# Patient Record
Sex: MALE | Race: OTHER | Employment: OTHER | URBAN - METROPOLITAN AREA
[De-identification: names, ages, dates, MRNs, and addresses within clinical notes are randomized per-mention and may not be internally consistent; named-entity substitution may affect disease eponyms.]

---

## 2017-01-23 DIAGNOSIS — I65.21 CAROTID ARTERY CALCIFICATION, RIGHT: Primary | ICD-10-CM

## 2017-01-23 NOTE — PROGRESS NOTES
The patient had a scan of the head and neck. Calcifications were found on the right carotid area.  I spoke to him and he agreed to have carotid dopplers done

## 2017-01-31 ENCOUNTER — HOSPITAL ENCOUNTER (OUTPATIENT)
Dept: VASCULAR SURGERY | Age: 77
Discharge: HOME | End: 2017-01-31
Attending: FAMILY MEDICINE
Payer: MEDICARE

## 2017-01-31 DIAGNOSIS — I65.21 CAROTID ARTERY CALCIFICATION, RIGHT: ICD-10-CM

## 2017-01-31 PROCEDURE — 93880 EXTRACRANIAL BILAT STUDY: CPT

## 2017-01-31 NOTE — PROCEDURES
[address]  *** FINAL REPORT ***    Name: Amira Maxwell  MRN: [id number]    Outpatient  : 1940  HIS Order #: [id number]  [id number] [facility] Visit #: [id number]  Date: 2017    TYPE OF TEST: Cerebrovascular Duplex    REASON FOR TEST  Carotid bruit    Right Carotid:-             Proximal               Mid                 Distal  cm/s  Systolic  Diastolic  Systolic  Diastolic  Systolic  Diastolic  CCA:    377.6      24.6                            88.8      18.9  Bulb:  ECA:    128.6      12.3  ICA:     76.5      22.6       96.5      29.2      117.3      31.7  ICA/CCA:  0.9       1.2    ICA Stenosis: <50%    Right Vertebral:-  Finding: Antegrade  Sys:       66.6  Wendy:       18.2    Right Subclavian:    Left Carotid:-            Proximal                Mid                 Distal  cm/s  Systolic  Diastolic  Systolic  Diastolic  Systolic  Diastolic  CCA:    111.7      31.0                           109.6      20.9  Bulb:  ECA:    137.1      11.1  ICA:    109.4      15.0       83.6      28.0       87.5      30.5  ICA/CCA:  1.0       0.7    ICA Stenosis: <50%    Left Vertebral:-  Finding: Antegrade  Sys:       52.9  Wendy:       13.6    Left Subclavian:    INTERPRETATION/FINDINGS  PROCEDURE:  Evaluation of the extracranial cerebrovascular arteries  with ultrasound (B-mode imaging, pulsed Doppler, color Doppler). Includes the common carotid, internal carotid, external carotid, and  vertebral arteries. FINDINGS: Intimal thickening observed in the CCA bilaterally. Mild-moderate hypoechoic plaque noted in the bulb and ICA. IMPRESSION: Findings are consistent with 0-49% stenosis of the right  internal carotid and 0-49% stenosis of the left internal carotid. Vertebrals are patent with antegrade flow. ADDITIONAL COMMENTS    I have personally reviewed the data relevant to the interpretation of  this  study.     TECHNOLOGIST: Judit Claire RDCS  Signed: 2017 04:25 PM    PHYSICIAN: Esme Hough. Rita Welch MD  Signed: 02/01/2017 08:31 AM

## 2017-02-02 NOTE — PROGRESS NOTES
Both of the carotid arteries have a mild-moderate amount of cholesterol plaque. It is less than 50 % so there is no need for surgery. An aspirin a day would be helpful to avoid more blockage. Also avoid fried food and keep the cholesterol level down. I will recheck your cholesterol in march-April.

## 2017-07-07 ENCOUNTER — OFFICE VISIT (OUTPATIENT)
Dept: FAMILY MEDICINE CLINIC | Age: 77
End: 2017-07-07

## 2017-07-07 VITALS
OXYGEN SATURATION: 96 % | WEIGHT: 187.2 LBS | SYSTOLIC BLOOD PRESSURE: 130 MMHG | BODY MASS INDEX: 28.37 KG/M2 | DIASTOLIC BLOOD PRESSURE: 78 MMHG | TEMPERATURE: 97.9 F | RESPIRATION RATE: 17 BRPM | HEART RATE: 84 BPM | HEIGHT: 68 IN

## 2017-07-07 DIAGNOSIS — C61 PROSTATE CANCER (HCC): Primary | ICD-10-CM

## 2017-07-07 DIAGNOSIS — E78.5 HYPERLIPIDEMIA, UNSPECIFIED HYPERLIPIDEMIA TYPE: ICD-10-CM

## 2017-07-07 DIAGNOSIS — I10 ESSENTIAL HYPERTENSION: ICD-10-CM

## 2017-07-07 DIAGNOSIS — E87.0 HYPERNATREMIA: ICD-10-CM

## 2017-07-07 DIAGNOSIS — R09.89 BRUIT OF RIGHT CAROTID ARTERY: ICD-10-CM

## 2017-07-07 RX ORDER — DOXAZOSIN 8 MG/1
8 TABLET ORAL DAILY
Qty: 30 TAB | Refills: 6 | Status: SHIPPED | OUTPATIENT
Start: 2017-07-07 | End: 2017-12-14 | Stop reason: SDUPTHER

## 2017-07-07 RX ORDER — TIMOLOL MALEATE 5 MG/ML
SOLUTION/ DROPS OPHTHALMIC
Refills: 5 | COMMUNITY
Start: 2017-03-30 | End: 2018-04-24

## 2017-07-07 RX ORDER — ENALAPRIL MALEATE 20 MG/1
20 TABLET ORAL DAILY
Qty: 30 TAB | Refills: 6 | Status: SHIPPED | OUTPATIENT
Start: 2017-07-07 | End: 2017-12-14 | Stop reason: SDUPTHER

## 2017-07-07 RX ORDER — BIMATOPROST 0.1 MG/ML
SOLUTION/ DROPS OPHTHALMIC
Refills: 3 | COMMUNITY
Start: 2017-03-30

## 2017-07-07 RX ORDER — FELODIPINE 10 MG/1
10 TABLET, EXTENDED RELEASE ORAL DAILY
Qty: 30 TAB | Refills: 6 | Status: SHIPPED | OUTPATIENT
Start: 2017-07-07 | End: 2017-12-14 | Stop reason: SDUPTHER

## 2017-07-07 NOTE — MR AVS SNAPSHOT
Visit Information Date & Time Provider Department Dept. Phone Encounter #  
 7/7/2017 10:30 AM Cherelle Haq MD [address] [place] [address] [id number] Upcoming Health Maintenance Date Due DTaP/Tdap/Td series (1 - Tdap) 6/29/1959 ZOSTER VACCINE AGE 60> 6/29/1998 GLAUCOMA SCREENING Q2Y 6/29/2003 Pneumococcal 65+ High/Highest Risk (1 of 2 - PCV13) 6/29/2003 INFLUENZA AGE 9 TO ADULT 8/1/2017 MEDICARE YEARLY EXAM 12/23/2017 Allergies as of 7/7/2017  Review Complete On: 7/7/2017 By: Cherelle Haq MD  
 No Known Allergies Current Immunizations  Never Reviewed No immunizations on file. Not reviewed this visit You Were Diagnosed With   
  
 Codes Comments Prostate cancer [facility])    -  Primary ICD-10-CM: Y38 ICD-9-CM: 280 Essential hypertension     ICD-10-CM: I10 
ICD-9-CM: 401.9 Bruit of right carotid artery     ICD-10-CM: R09.89 ICD-9-CM: 143. 9 Hyperlipidemia, unspecified hyperlipidemia type     ICD-10-CM: E78.5 ICD-9-CM: 272.4 Vitals BP Pulse Temp Resp Height(growth percentile) Weight(growth percentile) 130/78 84 97.9 °F (36.6 °C) (Oral) 17 5' 8\" (1.727 m) 187 lb 3.2 oz (84.9 kg) SpO2 BMI Smoking Status 96% 28.46 kg/m2 Passive Smoke Exposure - Never Smoker Vitals History BMI and BSA Data Body Mass Index Body Surface Area  
 28.46 kg/m 2 2.02 m 2 Preferred Pharmacy Pharmacy Name Phone [facility] [place], [address] [place] [facility] [phone] Your Updated Medication List  
  
   
This list is accurate as of: 7/7/17 11:23 AM.  Always use your most recent med list.  
  
  
  
  
 doxazosin 8 mg tablet Commonly known as:  CARDURA Take 1 Tab by mouth daily. enalapril 20 mg tablet Commonly known as:  Era Saupe Take 1 Tab by mouth daily. felodipine 10 mg 24 hr tablet Commonly known as:  PLENDIL SR Take 1 Tab by mouth daily. FLONASE 50 mcg/actuation nasal spray Generic drug:  fluticasone  
nightly. LUMIGAN 0.01 % ophthalmic drops Generic drug:  bimatoprost  
INT 1 GTT IN OU QD HS  
  
 propranolol 20 mg tablet Commonly known as:  INDERAL Take 1 Tab by mouth daily. timolol 0.5 % ophthalmic solution Commonly known as:  TIMOPTIC INT 1 GTT IN OU QAM  
  
  
  
  
Prescriptions Sent to Pharmacy Refills  
 doxazosin (CARDURA) 8 mg tablet 6 Sig: Take 1 Tab by mouth daily. Class: Normal  
 Pharmacy: [address] [facility] Ph #: [phone] Route: Oral  
 enalapril (VASOTEC) 20 mg tablet 6 Sig: Take 1 Tab by mouth daily. Class: Normal  
 Pharmacy: [address] [facility] Ph #: [phone] Route: Oral  
 felodipine (PLENDIL SR) 10 mg 24 hr tablet 6 Sig: Take 1 Tab by mouth daily. Class: Normal  
 Pharmacy: [address] [place] [address] Ph #: [phone] Route: Oral  
  
We Performed the Following LIPID PANEL [61440 CPT(R)] METABOLIC PANEL, COMPREHENSIVE [25173 CPT(R)] PSA W/ REFLX FREE PSA [17560 CPT(R)] REFERRAL TO UROLOGY [CUG152 Custom] Comments: H/o prostate CA, needs to continue care and surveilance. eval and treat Referral Information Referral ID Referred By Referred To  
  
 [id number] [facility] Erwin [address] [phone] [id number] [facility], [address] Visits Status Start Date End Date 1 New Request 7/7/17 7/7/18 If your referral has a status of pending review or denied, additional information will be sent to support the outcome of this decision. Patient Instructions DASH Diet: Care Instructions Your Care Instructions The DASH diet is an eating plan that can help lower your blood pressure. DASH stands for Dietary Approaches to Stop Hypertension. Hypertension is high blood pressure. The DASH diet focuses on eating foods that are high in calcium, potassium, and magnesium. These nutrients can lower blood pressure. The foods that are highest in these nutrients are fruits, vegetables, low-fat dairy products, nuts, seeds, and legumes. But taking calcium, potassium, and magnesium supplements instead of eating foods that are high in those nutrients does not have the same effect. The DASH diet also includes whole grains, fish, and poultry. The DASH diet is one of several lifestyle changes your doctor may recommend to lower your high blood pressure. Your doctor may also want you to decrease the amount of sodium in your diet. Lowering sodium while following the DASH diet can lower blood pressure even further than just the DASH diet alone. Follow-up care is a key part of your treatment and safety. Be sure to make and go to all appointments, and call your doctor if you are having problems. It's also a good idea to know your test results and keep a list of the medicines you take. How can you care for yourself at home? Following the DASH diet · Eat 4 to 5 servings of fruit each day. A serving is 1 medium-sized piece of fruit, ½ cup chopped or canned fruit, 1/4 cup dried fruit, or 4 ounces (½ cup) of fruit juice. Choose fruit more often than fruit juice. · Eat 4 to 5 servings of vegetables each day. A serving is 1 cup of lettuce or raw leafy vegetables, ½ cup of chopped or cooked vegetables, or 4 ounces (½ cup) of vegetable juice. Choose vegetables more often than vegetable juice. · Get 2 to 3 servings of low-fat and fat-free dairy each day. A serving is 8 ounces of milk, 1 cup of yogurt, or 1 ½ ounces of cheese. · Eat 6 to 8 servings of grains each day.  A serving is 1 slice of bread, 1 ounce of dry cereal, or ½ cup of cooked rice, pasta, or cooked cereal. Try to choose whole-grain products as much as possible. · Limit lean meat, poultry, and fish to 2 servings each day. A serving is 3 ounces, about the size of a deck of cards. · Eat 4 to 5 servings of nuts, seeds, and legumes (cooked dried beans, lentils, and split peas) each week. A serving is 1/3 cup of nuts, 2 tablespoons of seeds, or ½ cup of cooked beans or peas. · Limit fats and oils to 2 to 3 servings each day. A serving is 1 teaspoon of vegetable oil or 2 tablespoons of salad dressing. · Limit sweets and added sugars to 5 servings or less a week. A serving is 1 tablespoon jelly or jam, ½ cup sorbet, or 1 cup of lemonade. · Eat less than 2,300 milligrams (mg) of sodium a day. If you limit your sodium to 1,500 mg a day, you can lower your blood pressure even more. Tips for success · Start small. Do not try to make dramatic changes to your diet all at once. You might feel that you are missing out on your favorite foods and then be more likely to not follow the plan. Make small changes, and stick with them. Once those changes become habit, add a few more changes. · Try some of the following: ¨ Make it a goal to eat a fruit or vegetable at every meal and at snacks. This will make it easy to get the recommended amount of fruits and vegetables each day. ¨ Try yogurt topped with fruit and nuts for a snack or healthy dessert. ¨ Add lettuce, tomato, cucumber, and onion to sandwiches. ¨ Combine a ready-made pizza crust with low-fat mozzarella cheese and lots of vegetable toppings. Try using tomatoes, squash, spinach, broccoli, carrots, cauliflower, and onions. ¨ Have a variety of cut-up vegetables with a low-fat dip as an appetizer instead of chips and dip. ¨ Sprinkle sunflower seeds or chopped almonds over salads. Or try adding chopped walnuts or almonds to cooked vegetables. ¨ Try some vegetarian meals using beans and peas. Add garbanzo or kidney beans to salads. Make burritos and tacos with mashed perez beans or black beans. Where can you learn more? Go to http://yehuda-goldie.info/. Enter H038 in the search box to learn more about \"DASH Diet: Care Instructions. \" Current as of: April 3, 2017 Content Version: 11.3 © 8635-7152 vip.[employer]. Care instructions adapted under license by [employer] (which disclaims liability or warranty for this information). If you have questions about a medical condition or this instruction, always ask your healthcare professional. [address] any warranty or liability for your use of this information. Learning About High Blood Pressure What is high blood pressure? Blood pressure is a measure of how hard the blood pushes against the walls of your arteries. It's normal for blood pressure to go up and down throughout the day, but if it stays up, you have high blood pressure. Another name for high blood pressure is hypertension. Two numbers tell you your blood pressure. The first number is the systolic pressure. It shows how hard the blood pushes when your heart is pumping. The second number is the diastolic pressure. It shows how hard the blood pushes between heartbeats, when your heart is relaxed and filling with blood. A blood pressure of less than 120/80 (say \"120 over 80\") is ideal for an adult. High blood pressure is 140/90 or higher. You have high blood pressure if your top number is 140 or higher or your bottom number is 90 or higher, or both. Many people fall into the category in between, called prehypertension. People with prehypertension need to make lifestyle changes to bring their blood pressure down and help prevent or delay high blood pressure. What happens when you have high blood pressure? · Blood flows through your arteries with too much force. Over time, this damages the walls of your arteries. But you can't feel it. High blood pressure usually doesn't cause symptoms. · Fat and calcium start to build up in your arteries. This buildup is called plaque. Plaque makes your arteries narrower and stiffer. Blood can't flow through them as easily. · This lack of good blood flow starts to damage some of the organs in your body. This can lead to problems such as coronary artery disease and heart attack, heart failure, stroke, kidney failure, and eye damage. How can you prevent high blood pressure? · Stay at a healthy weight. · Try to limit how much sodium you eat to less than 2,300 milligrams (mg) a day. If you limit your sodium to 1,500 mg a day, you can lower your blood pressure even more. ¨ Buy foods that are labeled \"unsalted,\" \"sodium-free,\" or \"low-sodium. \" Foods labeled \"reduced-sodium\" and \"light sodium\" may still have too much sodium. ¨ Flavor your food with garlic, lemon juice, onion, vinegar, herbs, and spices instead of salt. Do not use soy sauce, steak sauce, onion salt, garlic salt, mustard, or ketchup on your food. ¨ Use less salt (or none) when recipes call for it. You can often use half the salt a recipe calls for without losing flavor. · Be physically active. Get at least 30 minutes of exercise on most days of the week. Walking is a good choice. You also may want to do other activities, such as running, swimming, cycling, or playing tennis or team sports. · Limit alcohol to 2 drinks a day for men and 1 drink a day for women. · Eat plenty of fruits, vegetables, and low-fat dairy products. Eat less saturated and total fats. How is high blood pressure treated? · Your doctor will suggest making lifestyle changes. For example, your doctor may ask you to eat healthy foods, quit smoking, lose extra weight, and be more active. · If lifestyle changes don't help enough or your blood pressure is very high, you will have to take medicine every day. Follow-up care is a key part of your treatment and safety. Be sure to make and go to all appointments, and call your doctor if you are having problems. It's also a good idea to know your test results and keep a list of the medicines you take. Where can you learn more? Go to http://yehuda-goldie.info/. Enter P501 in the search box to learn more about \"Learning About High Blood Pressure. \" Current as of: March 23, 2016 Content Version: 11.3 © 7568-6254 [employer]. Care instructions adapted under license by [employer] (which disclaims liability or warranty for this information). If you have questions about a medical condition or this instruction, always ask your healthcare professional. [address] any warranty or liability for your use of this information. Introducing [facility]! Dear Juanis Delatorre: Thank you for requesting a Pathable account. Our records indicate that you already have an active Pathable account. You can access your account anytime at [email or web address] Did you know that you can access your hospital and ER discharge instructions at any time in Pathable? You can also review all of your test results from your hospital stay or ER visit. Additional Information If you have questions, please visit the Frequently Asked Questions section of the Pathable website at [email or web address] Remember, Pathable is NOT to be used for urgent needs. For medical emergencies, dial 911. Now available from your iPhone and Android! Please provide this summary of care documentation to your next provider. Your primary care clinician is listed as Vera Pearson. If you have any questions after today's visit, please call [phone].

## 2017-07-07 NOTE — PROGRESS NOTES
Chief Complaint   Patient presents with    Hypertension     he is a 76y.o. year old male who presents for evalution. No chest pain or sob , doing well  He has h/o prostate cancer. His last psa in Dec was 8.8 ut he did not see the urology clinic yet      Reviewed PmHx, RxHx, FmHx, SocHx, AllgHx and updated and dated in the chart. Patient Active Problem List    Diagnosis    Prostate cancer [facility])    Essential hypertension       Nurse notes were reviewed and copied and are correct  Review of Systems - negative except as listed above in the HPI    Objective:     Vitals:    07/07/17 1043   BP: 130/78   Pulse: 84   Resp: 17   Temp: 97.9 °F (36.6 °C)   TempSrc: Oral   SpO2: 96%   Weight: 187 lb 3.2 oz (84.9 kg)   Height: 5' 8\" (1.727 m)     Physical Examination: General appearance - alert, well appearing, and in no distress and oriented to person, place, and time  Mental status - alert, oriented to person, place, and time  Neck - supple, no significant adenopathy, carotid bruit noted on right  Lymphatics - no hepatosplenomegaly  Chest - clear to auscultation, no wheezes, rales or rhonchi, symmetric air entry  Heart - normal rate, regular rhythm, normal S1, S2, no murmurs, rubs, clicks or gallops  Neurological - alert, oriented, normal speech, no focal findings or movement disorder noted  Musculoskeletal - no joint tenderness, deformity or swelling  Extremities - peripheral pulses normal, no pedal edema, no clubbing or cyanosis  Skin - normal coloration and turgor, no rashes, no suspicious skin lesions noted         Assessment/ Plan:   Croina Sun was seen today for hypertension. Diagnoses and all orders for this visit:    Prostate cancer ([facility] [address]  -     REFERRAL TO UROLOGY  -     PSA W/ REFLX FREE PSA    Essential hypertension  -     doxazosin (CARDURA) 8 mg tablet; Take 1 Tab by mouth daily. -     enalapril (VASOTEC) 20 mg tablet; Take 1 Tab by mouth daily. -     felodipine (PLENDIL SR) 10 mg 24 hr tablet;  Take 1 Tab by mouth daily.  -     METABOLIC PANEL, COMPREHENSIVE    Bruit of right carotid artery  -     LIPID PANEL    Hyperlipidemia, unspecified hyperlipidemia type  -     LIPID PANEL      If the ldl has not decreased I will start lipitor. I discussed this plan with Mr Prabhjot Kan and he agrees  Follow-up Disposition: Not on File    ICD-10-CM ICD-9-CM    1. Prostate cancer ([facility] [address] C61 185 REFERRAL TO UROLOGY      PSA W/ REFLX FREE PSA   2. Essential hypertension I10 401.9 doxazosin (CARDURA) 8 mg tablet      enalapril (VASOTEC) 20 mg tablet      felodipine (PLENDIL SR) 10 mg 24 hr tablet      METABOLIC PANEL, COMPREHENSIVE   3. Bruit of right carotid artery R09.89 785.9 LIPID PANEL   4. Hyperlipidemia, unspecified hyperlipidemia type E78.5 272.4 LIPID PANEL       I have discussed the diagnosis with the patient and the intended plan as seen in the above orders. The patient has received an after-visit summary and questions were answered concerning future plans. Medication Side Effects and Warnings were discussed with patient: yes  Patient Labs were reviewed and or requested: yes  Patient Past Records were reviewed and or requested: yes        There are no Patient Instructions on file for this visit.     The patient verbalizes understanding and agrees with the plan of care        Patient has the advanced directives booklet to review

## 2017-07-07 NOTE — PROGRESS NOTES
1. Have you been to the ER, urgent care clinic since your last visit? Hospitalized since your last visit? No     2. Have you seen or consulted any other health care providers outside of the [address] [place] [address] since your last visit? Include any pap smears or colon screening.  No    Chief Complaint   Patient presents with    Hypertension

## 2017-07-07 NOTE — PATIENT INSTRUCTIONS
DASH Diet: Care Instructions  Your Care Instructions  The DASH diet is an eating plan that can help lower your blood pressure. DASH stands for Dietary Approaches to Stop Hypertension. Hypertension is high blood pressure. The DASH diet focuses on eating foods that are high in calcium, potassium, and magnesium. These nutrients can lower blood pressure. The foods that are highest in these nutrients are fruits, vegetables, low-fat dairy products, nuts, seeds, and legumes. But taking calcium, potassium, and magnesium supplements instead of eating foods that are high in those nutrients does not have the same effect. The DASH diet also includes whole grains, fish, and poultry. The DASH diet is one of several lifestyle changes your doctor may recommend to lower your high blood pressure. Your doctor may also want you to decrease the amount of sodium in your diet. Lowering sodium while following the DASH diet can lower blood pressure even further than just the DASH diet alone. Follow-up care is a key part of your treatment and safety. Be sure to make and go to all appointments, and call your doctor if you are having problems. It's also a good idea to know your test results and keep a list of the medicines you take. How can you care for yourself at home? Following the DASH diet  · Eat 4 to 5 servings of fruit each day. A serving is 1 medium-sized piece of fruit, ½ cup chopped or canned fruit, 1/4 cup dried fruit, or 4 ounces (½ cup) of fruit juice. Choose fruit more often than fruit juice. · Eat 4 to 5 servings of vegetables each day. A serving is 1 cup of lettuce or raw leafy vegetables, ½ cup of chopped or cooked vegetables, or 4 ounces (½ cup) of vegetable juice. Choose vegetables more often than vegetable juice. · Get 2 to 3 servings of low-fat and fat-free dairy each day. A serving is 8 ounces of milk, 1 cup of yogurt, or 1 ½ ounces of cheese. · Eat 6 to 8 servings of grains each day.  A serving is 1 slice of bread, 1 ounce of dry cereal, or ½ cup of cooked rice, pasta, or cooked cereal. Try to choose whole-grain products as much as possible. · Limit lean meat, poultry, and fish to 2 servings each day. A serving is 3 ounces, about the size of a deck of cards. · Eat 4 to 5 servings of nuts, seeds, and legumes (cooked dried beans, lentils, and split peas) each week. A serving is 1/3 cup of nuts, 2 tablespoons of seeds, or ½ cup of cooked beans or peas. · Limit fats and oils to 2 to 3 servings each day. A serving is 1 teaspoon of vegetable oil or 2 tablespoons of salad dressing. · Limit sweets and added sugars to 5 servings or less a week. A serving is 1 tablespoon jelly or jam, ½ cup sorbet, or 1 cup of lemonade. · Eat less than 2,300 milligrams (mg) of sodium a day. If you limit your sodium to 1,500 mg a day, you can lower your blood pressure even more. Tips for success  · Start small. Do not try to make dramatic changes to your diet all at once. You might feel that you are missing out on your favorite foods and then be more likely to not follow the plan. Make small changes, and stick with them. Once those changes become habit, add a few more changes. · Try some of the following:  ¨ Make it a goal to eat a fruit or vegetable at every meal and at snacks. This will make it easy to get the recommended amount of fruits and vegetables each day. ¨ Try yogurt topped with fruit and nuts for a snack or healthy dessert. ¨ Add lettuce, tomato, cucumber, and onion to sandwiches. ¨ Combine a ready-made pizza crust with low-fat mozzarella cheese and lots of vegetable toppings. Try using tomatoes, squash, spinach, broccoli, carrots, cauliflower, and onions. ¨ Have a variety of cut-up vegetables with a low-fat dip as an appetizer instead of chips and dip. ¨ Sprinkle sunflower seeds or chopped almonds over salads. Or try adding chopped walnuts or almonds to cooked vegetables. ¨ Try some vegetarian meals using beans and peas. Add garbanzo or kidney beans to salads. Make burritos and tacos with mashed perez beans or black beans. Where can you learn more? Go to http://yehuda-goldie.info/. Enter P889 in the search box to learn more about \"DASH Diet: Care Instructions. \"  Current as of: April 3, 2017  Content Version: 11.3  © 6418-2921 [employer]. Care instructions adapted under license by [employer] (which disclaims liability or warranty for this information). If you have questions about a medical condition or this instruction, always ask your healthcare professional. [place] [address] any warranty or liability for your use of this information. Learning About High Blood Pressure  What is high blood pressure? Blood pressure is a measure of how hard the blood pushes against the walls of your arteries. It's normal for blood pressure to go up and down throughout the day, but if it stays up, you have high blood pressure. Another name for high blood pressure is hypertension. Two numbers tell you your blood pressure. The first number is the systolic pressure. It shows how hard the blood pushes when your heart is pumping. The second number is the diastolic pressure. It shows how hard the blood pushes between heartbeats, when your heart is relaxed and filling with blood. A blood pressure of less than 120/80 (say \"120 over 80\") is ideal for an adult. High blood pressure is 140/90 or higher. You have high blood pressure if your top number is 140 or higher or your bottom number is 90 or higher, or both. Many people fall into the category in between, called prehypertension. People with prehypertension need to make lifestyle changes to bring their blood pressure down and help prevent or delay high blood pressure. What happens when you have high blood pressure? · Blood flows through your arteries with too much force. Over time, this damages the walls of your arteries.  But you can't feel it. High blood pressure usually doesn't cause symptoms. · Fat and calcium start to build up in your arteries. This buildup is called plaque. Plaque makes your arteries narrower and stiffer. Blood can't flow through them as easily. · This lack of good blood flow starts to damage some of the organs in your body. This can lead to problems such as coronary artery disease and heart attack, heart failure, stroke, kidney failure, and eye damage. How can you prevent high blood pressure? · Stay at a healthy weight. · Try to limit how much sodium you eat to less than 2,300 milligrams (mg) a day. If you limit your sodium to 1,500 mg a day, you can lower your blood pressure even more. ¨ Buy foods that are labeled \"unsalted,\" \"sodium-free,\" or \"low-sodium. \" Foods labeled \"reduced-sodium\" and \"light sodium\" may still have too much sodium. ¨ Flavor your food with garlic, lemon juice, onion, vinegar, herbs, and spices instead of salt. Do not use soy sauce, steak sauce, onion salt, garlic salt, mustard, or ketchup on your food. ¨ Use less salt (or none) when recipes call for it. You can often use half the salt a recipe calls for without losing flavor. · Be physically active. Get at least 30 minutes of exercise on most days of the week. Walking is a good choice. You also may want to do other activities, such as running, swimming, cycling, or playing tennis or team sports. · Limit alcohol to 2 drinks a day for men and 1 drink a day for women. · Eat plenty of fruits, vegetables, and low-fat dairy products. Eat less saturated and total fats. How is high blood pressure treated? · Your doctor will suggest making lifestyle changes. For example, your doctor may ask you to eat healthy foods, quit smoking, lose extra weight, and be more active. · If lifestyle changes don't help enough or your blood pressure is very high, you will have to take medicine every day.   Follow-up care is a key part of your treatment and safety. Be sure to make and go to all appointments, and call your doctor if you are having problems. It's also a good idea to know your test results and keep a list of the medicines you take. Where can you learn more? Go to http://yehuda-goldie.info/. Enter P501 in the search box to learn more about \"Learning About High Blood Pressure. \"  Current as of: March 23, 2016  Content Version: 11.3  © 3643-7794 [employer]. Care instructions adapted under license by [employer] (which disclaims liability or warranty for this information). If you have questions about a medical condition or this instruction, always ask your healthcare professional. [address] any warranty or liability for your use of this information.

## 2017-07-08 LAB
ALBUMIN SERPL-MCNC: 4.4 G/DL (ref 3.5–4.8)
ALBUMIN/GLOB SERPL: 1.8 {RATIO} (ref 1.2–2.2)
ALP SERPL-CCNC: 62 IU/L (ref 39–117)
ALT SERPL-CCNC: 10 IU/L (ref 0–44)
AST SERPL-CCNC: 15 IU/L (ref 0–40)
BILIRUB SERPL-MCNC: 0.2 MG/DL (ref 0–1.2)
BUN SERPL-MCNC: 19 MG/DL (ref 8–27)
BUN/CREAT SERPL: 15 (ref 10–24)
CALCIUM SERPL-MCNC: 9.9 MG/DL (ref 8.6–10.2)
CHLORIDE SERPL-SCNC: 107 MMOL/L (ref 96–106)
CHOLEST SERPL-MCNC: 220 MG/DL (ref 100–199)
CO2 SERPL-SCNC: 28 MMOL/L (ref 18–29)
CREAT SERPL-MCNC: 1.27 MG/DL (ref 0.76–1.27)
GLOBULIN SER CALC-MCNC: 2.5 G/DL (ref 1.5–4.5)
GLUCOSE SERPL-MCNC: 103 MG/DL (ref 65–99)
HDLC SERPL-MCNC: 71 MG/DL
INTERPRETATION, 910389: NORMAL
INTERPRETATION: NORMAL
LDLC SERPL CALC-MCNC: 138 MG/DL (ref 0–99)
PDF IMAGE, 910387: NORMAL
POTASSIUM SERPL-SCNC: 4.6 MMOL/L (ref 3.5–5.2)
PROT SERPL-MCNC: 6.9 G/DL (ref 6–8.5)
PSA FREE MFR SERPL: 32.7 %
PSA FREE SERPL-MCNC: 3.01 NG/ML
PSA SERPL-MCNC: 9.2 NG/ML (ref 0–4)
REFLEX CRITERIA: ABNORMAL
SODIUM SERPL-SCNC: 146 MMOL/L (ref 134–144)
TRIGL SERPL-MCNC: 57 MG/DL (ref 0–149)
VLDLC SERPL CALC-MCNC: 11 MG/DL (ref 5–40)

## 2017-07-10 NOTE — PROGRESS NOTES
Your sodium level is above normal this time. I want to repeat that right away.  Avoid eating excess sodium such as in junk food and canned food as well as processed meats like arita and sausage

## 2017-12-14 ENCOUNTER — OFFICE VISIT (OUTPATIENT)
Dept: FAMILY MEDICINE CLINIC | Age: 77
End: 2017-12-14

## 2017-12-14 VITALS
TEMPERATURE: 98.3 F | HEART RATE: 76 BPM | OXYGEN SATURATION: 98 % | RESPIRATION RATE: 17 BRPM | SYSTOLIC BLOOD PRESSURE: 178 MMHG | DIASTOLIC BLOOD PRESSURE: 83 MMHG | WEIGHT: 186.7 LBS | BODY MASS INDEX: 28.3 KG/M2 | HEIGHT: 68 IN

## 2017-12-14 DIAGNOSIS — E78.5 HYPERLIPIDEMIA, UNSPECIFIED HYPERLIPIDEMIA TYPE: ICD-10-CM

## 2017-12-14 DIAGNOSIS — C61 PROSTATE CANCER (HCC): ICD-10-CM

## 2017-12-14 DIAGNOSIS — I10 ESSENTIAL HYPERTENSION: Primary | ICD-10-CM

## 2017-12-14 NOTE — PROGRESS NOTES
Chief Complaint   Patient presents with    Hypertension     he is a 76y.o. year old male who presents for evalution. Here to follow up on HTN  His bp is high right now  At home the BP is normally in the 751T systolic    No chest pain or SOB doing well with curret med  No trouble with urine or stool  He had an eye exam   Reviewed PmHx, RxHx, FmHx, SocHx, AllgHx and updated and dated in the chart. Aspirin yes ____   No____ N/A____    Patient Active Problem List    Diagnosis    Hyperlipidemia    Prostate cancer ([facility] [address]    Essential hypertension       Nurse notes were reviewed and copied and are correct  Review of Systems - negative except as listed above in the HPI    Objective:     Vitals:    12/14/17 1304   BP: 178/83   Pulse: 76   Resp: 17   Temp: 98.3 °F (36.8 °C)   TempSrc: Oral   SpO2: 98%   Weight: 186 lb 11.2 oz (84.7 kg)   Height: 5' 8\" (1.727 m)     Physical Examination: General appearance - alert, well appearing, and in no distress  Mental status - alert, oriented to person, place, and time  Neck - supple, no significant adenopathy  Lymphatics - no palpable lymphadenopathy, no hepatosplenomegaly  Chest - clear to auscultation, no wheezes, rales or rhonchi, symmetric air entry  Heart - normal rate, regular rhythm, normal S1, S2, no murmurs, rubs, clicks or gallops  Abdomen - soft, nontender, nondistended, no masses or organomegaly  Neurological - alert, oriented, normal speech, no focal findings or movement disorder noted  Musculoskeletal - no joint tenderness, deformity or swelling  Extremities - peripheral pulses normal, no pedal edema, no clubbing or cyanosis         Assessment/ Plan:   Diagnoses and all orders for this visit:    1. Essential hypertension  -     METABOLIC PANEL, COMPREHENSIVE  BP normal at home  He checks it regularly, his wife is a nurse and helps him monitor  2. Prostate cancer ([facility] [address]  -     PSA W/ REFLX FREE PSA  Original diagnosis was made at [facility].  Urology referral has been made to Dr Solange Balderrama. He will make the appt. I am rechecking the level today  3. Hyperlipidemia, unspecified hyperlipidemia type  -     LIPID PANEL       Follow-up Disposition:  Return in about 3 months (around 3/14/2018). ICD-10-CM ICD-9-CM    1. Essential hypertension R85 517.0 METABOLIC PANEL, COMPREHENSIVE   2. Prostate cancer (HCC) C61 185 PSA W/ REFLX FREE PSA   3. Hyperlipidemia, unspecified hyperlipidemia type E78.5 272.4 LIPID PANEL       I have discussed the diagnosis with the patient and the intended plan as seen in the above orders. The patient has received an after-visit summary and questions were answered concerning future plans. Medication Side Effects and Warnings were discussed with patient: yes  Patient Labs were reviewed and or requested: yes  Patient Past Records were reviewed and or requested: yes        There are no Patient Instructions on file for this visit.     The patient verbalizes understanding and agrees with the plan of care        Patient has the advanced directives booklet to review

## 2017-12-14 NOTE — LETTER
12/22/2017 10:33 AM 
 
Mr. John Gutierres 
[address] [id number] Dear John Gutierres: 
 
Please find your most recent results below. Resulted Orders METABOLIC PANEL, COMPREHENSIVE Result Value Ref Range Glucose 87 65 - 99 mg/dL BUN 13 8 - 27 mg/dL Creatinine 1.16 0.76 - 1.27 mg/dL GFR est non-AA 60 >59 mL/min/1.73 GFR est AA 69 >59 mL/min/1.73  
 BUN/Creatinine ratio 11 10 - 24 Sodium 143 134 - 144 mmol/L Potassium 4.1 3.5 - 5.2 mmol/L Chloride 105 96 - 106 mmol/L  
 CO2 26 18 - 29 mmol/L Calcium 9.6 8.6 - 10.2 mg/dL Protein, total 7.1 6.0 - 8.5 g/dL Albumin 4.6 3.5 - 4.8 g/dL GLOBULIN, TOTAL 2.5 1.5 - 4.5 g/dL A-G Ratio 1.8 1.2 - 2.2 Bilirubin, total 0.3 0.0 - 1.2 mg/dL Alk. phosphatase 62 39 - 117 IU/L  
 AST (SGOT) 14 0 - 40 IU/L  
 ALT (SGPT) 9 0 - 44 IU/L Narrative Performed at:  [address]  [id number] : Pasquale Anguiano MD, Phone:  [phone] LIPID PANEL Result Value Ref Range Cholesterol, total 233 (H) 100 - 199 mg/dL Triglyceride 65 0 - 149 mg/dL HDL Cholesterol 67 >39 mg/dL VLDL, calculated 13 5 - 40 mg/dL LDL, calculated 153 (H) 0 - 99 mg/dL Narrative Performed at:  [address]  [id number] : Pasquale Anguiano MD, Phone:  [phone] PSA W/ REFLX FREE PSA Result Value Ref Range Prostate Specific Ag 8.9 (H) 0.0 - 4.0 ng/mL Comment:  
   Roche ECLIA methodology. According to the American Urological Association, Serum PSA should 
decrease and remain at undetectable levels after radical 
prostatectomy. The AUA defines biochemical recurrence as an initial 
PSA value 0.2 ng/mL or greater followed by a subsequent confirmatory PSA value 0.2 ng/mL or greater. Values obtained with different assay methods or kits cannot be used interchangeably. Results cannot be interpreted as absolute evidence 
of the presence or absence of malignant disease. Reflex Criteria Comment Comment:  
   The percent free PSA is performed on a reflex basis only when the 
total PSA is between 4.0 and 10.0 ng/mL. Narrative Performed at:  [address]  [id number] : Deshawn Abrams MD, Phone:  [phone] PSA, FREE Result Value Ref Range PSA, Free 3.10 N/A ng/mL Comment:  
   Roche ECLIA methodology. % Free PSA 34.8 % Comment: The table below lists the probability of prostate cancer for 
men with non-suspicious JIGNESH results and total PSA between 4 and 10 ng/mL, by patient age Augustine Cadena, [facility], 
864:[id number]). % Free PSA       50-64 yr        65-75 yr 
                  0.00-10.00%        56%             55% 10.01-15.00%        24%             35% 15.01-20.00%        17%             23% 20.01-25.00%        10%             20% 
                      >25.00%         5%              9% 
Please note:  Syeda et al did not make specific 
              recommendations regarding the use of 
              percent free PSA for any other population 
              of men. Narrative Performed at:  [address]  [id number] : Deshawn Abrams MD, Phone:  [phone] CVD REPORT Result Value Ref Range INTERPRETATION Note Comment:  
   Supplemental report is available. Narrative Performed at:  [address] [place] [address]  [id number] : Amanda Pinto PhD, Phone:  [phone] RECOMMENDATIONS: 
The liver and kidney are normal  
The cholesterol is not better, it has increased slightly. Avoid fried food and fast food The PSA is 8.9 whic is slightly lower than it was 5 months ago but still is not a normal level Keep the plan for follow up with urology, Dr Solange Balderrama Please call me if you have any questions: [phone] Sincerely, 
 
 
Arvin Jesus MD

## 2017-12-14 NOTE — MR AVS SNAPSHOT
Visit Information Date & Time Provider Department Dept. Phone Encounter #  
 12/14/2017  1:00 PM Pedro Pablo Amaro MD [address] [place] [address] [id number] Follow-up Instructions Return in about 3 months (around 3/14/2018). Upcoming Health Maintenance Date Due  
 GLAUCOMA SCREENING Q2Y 6/29/2003 MEDICARE YEARLY EXAM 12/23/2017 Pneumococcal 65+ High/Highest Risk (2 of 2 - PPSV23) 2/8/2018 DTaP/Tdap/Td series (2 - Td) 12/14/2027 Allergies as of 12/14/2017  Review Complete On: 12/14/2017 By: Pedro Pablo Amaro MD  
 No Known Allergies Current Immunizations  Never Reviewed No immunizations on file. Not reviewed this visit You Were Diagnosed With   
  
 Codes Comments Essential hypertension    -  Primary ICD-10-CM: I10 
ICD-9-CM: 401.9 Prostate cancer [facility])     ICD-10-CM: C25 ICD-9-CM: 293 Hyperlipidemia, unspecified hyperlipidemia type     ICD-10-CM: E78.5 ICD-9-CM: 272.4 Vitals BP Pulse Temp Resp Height(growth percentile) Weight(growth percentile) 178/83 76 98.3 °F (36.8 °C) (Oral) 17 5' 8\" (1.727 m) 186 lb 11.2 oz (84.7 kg) SpO2 BMI Smoking Status 98% 28.39 kg/m2 Passive Smoke Exposure - Never Smoker Vitals History BMI and BSA Data Body Mass Index Body Surface Area  
 28.39 kg/m 2 2.02 m 2 Preferred Pharmacy Pharmacy Name Phone [facility] [place], [address] [place] AT [facility] [phone] Your Updated Medication List  
  
   
This list is accurate as of: 12/14/17  1:41 PM.  Always use your most recent med list.  
  
  
  
  
 doxazosin 8 mg tablet Commonly known as:  CARDURA TAKE 1 TABLET BY MOUTH DAILY  
  
 enalapril 20 mg tablet Commonly known as:  VASOTEC  
TAKE 1 TABLET BY MOUTH DAILY  
  
 felodipine 10 mg 24 hr tablet Commonly known as:  PLENDIL SR  
TAKE 1 TABLET BY MOUTH DAILY FLONASE 50 mcg/actuation nasal spray Generic drug:  fluticasone  
nightly. LUMIGAN 0.01 % ophthalmic drops Generic drug:  bimatoprost  
INT 1 GTT IN OU QD HS  
  
 timolol 0.5 % ophthalmic solution Commonly known as:  TIMOPTIC INT 1 GTT IN OU QAM  
  
  
  
  
We Performed the Following LIPID PANEL [41363 CPT(R)] METABOLIC PANEL, COMPREHENSIVE [06362 CPT(R)] PSA W/ REFLX FREE PSA [91636 CPT(R)] Follow-up Instructions Return in about 3 months (around 3/14/2018). Introducing [facility]! Dear Liliana Celeste: Thank you for requesting a [email or web address] account. Our records indicate that you already have an active [email or web address] account. You can access your account anytime at [email or web address] Did you know that you can access your hospital and ER discharge instructions at any time in [email or web address]? You can also review all of your test results from your hospital stay or ER visit. Additional Information If you have questions, please visit the Frequently Asked Questions section of the [email or web address] website at [email or web address] Remember, [email or web address] is NOT to be used for urgent needs. For medical emergencies, dial 911. Now available from your iPhone and Android! Please provide this summary of care documentation to your next provider. Your [facility] care clinician [facility]. If you have any questions after today's visit, please call [phone].

## 2017-12-14 NOTE — PROGRESS NOTES
1. Have you been to the ER, urgent care clinic since your last visit? Hospitalized since your last visit? No    2. Have you seen or consulted any other health care providers outside of the [address] [place] [address] since your last visit? Include any pap smears or colon screening. No     Chief Complaint   Patient presents with    Hypertension     Patient has no health concerns today.

## 2017-12-15 LAB
ALBUMIN SERPL-MCNC: 4.6 G/DL (ref 3.5–4.8)
ALBUMIN/GLOB SERPL: 1.8 {RATIO} (ref 1.2–2.2)
ALP SERPL-CCNC: 62 IU/L (ref 39–117)
ALT SERPL-CCNC: 9 IU/L (ref 0–44)
AST SERPL-CCNC: 14 IU/L (ref 0–40)
BILIRUB SERPL-MCNC: 0.3 MG/DL (ref 0–1.2)
BUN SERPL-MCNC: 13 MG/DL (ref 8–27)
BUN/CREAT SERPL: 11 (ref 10–24)
CALCIUM SERPL-MCNC: 9.6 MG/DL (ref 8.6–10.2)
CHLORIDE SERPL-SCNC: 105 MMOL/L (ref 96–106)
CHOLEST SERPL-MCNC: 233 MG/DL (ref 100–199)
CO2 SERPL-SCNC: 26 MMOL/L (ref 18–29)
CREAT SERPL-MCNC: 1.16 MG/DL (ref 0.76–1.27)
GFR SERPLBLD CREATININE-BSD FMLA CKD-EPI: 60 ML/MIN/1.73
GFR SERPLBLD CREATININE-BSD FMLA CKD-EPI: 69 ML/MIN/1.73
GLOBULIN SER CALC-MCNC: 2.5 G/DL (ref 1.5–4.5)
GLUCOSE SERPL-MCNC: 87 MG/DL (ref 65–99)
HDLC SERPL-MCNC: 67 MG/DL
INTERPRETATION, 910389: NORMAL
LDLC SERPL CALC-MCNC: 153 MG/DL (ref 0–99)
POTASSIUM SERPL-SCNC: 4.1 MMOL/L (ref 3.5–5.2)
PROT SERPL-MCNC: 7.1 G/DL (ref 6–8.5)
PSA FREE MFR SERPL: 34.8 %
PSA FREE SERPL-MCNC: 3.1 NG/ML
PSA SERPL-MCNC: 8.9 NG/ML (ref 0–4)
REFLEX CRITERIA: ABNORMAL
SODIUM SERPL-SCNC: 143 MMOL/L (ref 134–144)
TRIGL SERPL-MCNC: 65 MG/DL (ref 0–149)
VLDLC SERPL CALC-MCNC: 13 MG/DL (ref 5–40)

## 2017-12-18 ENCOUNTER — TELEPHONE (OUTPATIENT)
Dept: FAMILY MEDICINE CLINIC | Age: 77
End: 2017-12-18

## 2017-12-18 NOTE — TELEPHONE ENCOUNTER
----- Message from Virgie Leader sent at 12/15/2017 10:23 AM EST -----  Regarding: Dr. Vimal De Los Santos  Pt stated, Bp results last night, December 14 at 8:35pm, were the following, SYS: 148, BILL: 77, PUL: 73. Best contact number [phone].

## 2017-12-18 NOTE — TELEPHONE ENCOUNTER
Attmpeted to contact pt to advise. No answer at best contact provided and unable to LVM due to Vm full.

## 2017-12-21 NOTE — PROGRESS NOTES
The liver and kidney are normal  The cholesterol is not better, it has increased slightly.  Avoid fried food and fast food  The PSA is 8.9 whic is slightly lower than it was 5 months ago but still is not a normal level  Keep the plan for follow up with urology, Dr Trivedi Ang

## 2017-12-22 NOTE — PROGRESS NOTES
Several attempts made to contact pt to advise of lab results. No call back received. Letter with results sent to address on file.

## 2018-04-24 ENCOUNTER — OFFICE VISIT (OUTPATIENT)
Dept: FAMILY MEDICINE CLINIC | Age: 78
End: 2018-04-24

## 2018-04-24 VITALS
BODY MASS INDEX: 27.89 KG/M2 | HEART RATE: 85 BPM | DIASTOLIC BLOOD PRESSURE: 71 MMHG | WEIGHT: 184 LBS | HEIGHT: 68 IN | OXYGEN SATURATION: 95 % | RESPIRATION RATE: 17 BRPM | TEMPERATURE: 98.2 F | SYSTOLIC BLOOD PRESSURE: 150 MMHG

## 2018-04-24 DIAGNOSIS — E78.5 HYPERLIPIDEMIA, UNSPECIFIED HYPERLIPIDEMIA TYPE: ICD-10-CM

## 2018-04-24 DIAGNOSIS — C61 PROSTATE CANCER (HCC): Primary | ICD-10-CM

## 2018-04-24 DIAGNOSIS — I10 ESSENTIAL HYPERTENSION: ICD-10-CM

## 2018-04-24 RX ORDER — AMOXICILLIN 500 MG/1
CAPSULE ORAL
Refills: 0 | COMMUNITY
Start: 2018-04-20 | End: 2021-02-26

## 2018-04-24 RX ORDER — CHLORHEXIDINE GLUCONATE 1.2 MG/ML
RINSE ORAL
Refills: 99 | COMMUNITY
Start: 2018-04-20

## 2018-04-24 NOTE — PROGRESS NOTES
1. Have you been to the ER, urgent care clinic since your last visit? Hospitalized since your last visit? No    2. Have you seen or consulted any other health care providers outside of the [facility] since your last visit? Include any pap smears or colon screening.  No       Chief Complaint   Patient presents with    Cholesterol Problem     3 mth f/u    Hypertension     3 mth f/u    Medication Evaluation

## 2018-04-24 NOTE — MR AVS SNAPSHOT
[address] [place] [id number] 
[phone] Patient: Jason Collins MRN: [id number] LHO:[id number] Visit Information Date & Time Provider Department Dept. Phone Encounter #  
 4/24/2018 10:00 AM Aubrie Obrien MD [address] [place] [address] [id number] Upcoming Health Maintenance Date Due Pneumococcal 65+ High/Highest Risk (2 of 2 - PPSV23) 2/8/2018 MEDICARE YEARLY EXAM 3/14/2018 GLAUCOMA SCREENING Q2Y 11/16/2019 DTaP/Tdap/Td series (2 - Td) 12/14/2027 Allergies as of 4/24/2018  Review Complete On: 4/24/2018 By: Aubrie Obrien MD  
 No Known Allergies Current Immunizations  Never Reviewed No immunizations on file. Not reviewed this visit You Were Diagnosed With   
  
 Codes Comments Prostate cancer [facility])    -  Primary ICD-10-CM: Y00 ICD-9-CM: 057 Hyperlipidemia, unspecified hyperlipidemia type     ICD-10-CM: E78.5 ICD-9-CM: 272.4 Essential hypertension     ICD-10-CM: I10 
ICD-9-CM: 401.9 Vitals BP Pulse Temp Resp Height(growth percentile) Weight(growth percentile) 150/71 85 98.2 °F (36.8 °C) (Oral) 17 5' 8\" (1.727 m) 184 lb (83.5 kg) SpO2 BMI Smoking Status 95% 27.98 kg/m2 Passive Smoke Exposure - Never Smoker Vitals History BMI and BSA Data Body Mass Index Body Surface Area  
 27.98 kg/m 2 2 m 2 Preferred Pharmacy Pharmacy Name Phone [facility] [place], [address] [place] AT [facility] [phone] Your Updated Medication List  
  
   
This list is accurate as of 4/24/18 11:26 AM.  Always use your most recent med list.  
  
  
  
  
 amoxicillin 500 mg capsule Commonly known as:  AMOXIL TK 1 C PO TID . START THE DAY BEFORE SURGERY  
  
 chlorhexidine 0.12 % solution Commonly known as:  PERIDEX RINSE WITH 1/2 OUNCE BID AFTER BRUSHING  
  
 doxazosin 8 mg tablet Commonly known as:  CARDURA TAKE 1 TABLET BY MOUTH DAILY  
  
 enalapril 20 mg tablet Commonly known as:  VASOTEC  
TAKE 1 TABLET BY MOUTH DAILY  
  
 felodipine 10 mg 24 hr tablet Commonly known as:  PLENDIL SR  
TAKE 1 TABLET BY MOUTH DAILY  
  
 FLONASE 50 mcg/actuation nasal spray Generic drug:  fluticasone  
nightly. LUMIGAN 0.01 % ophthalmic drops Generic drug:  bimatoprost  
INT 1 GTT IN OU QD HS We Performed the Following LIPID PANEL [67299 CPT(R)] METABOLIC PANEL, COMPREHENSIVE [34221 CPT(R)] PSA W/ REFLX FREE PSA [99144 CPT(R)] REFERRAL TO UROLOGY [IEQ717 Custom] Comments: H/o prostate CA, treated at [facility] . Initial diagnoses about 10 years ago by biopsy, no surgical treatment was done. Now asymptomatic right now. eval and treat Referral Information Referral ID Referred By Referred To  
  
 [id number] [facility] [id number] [place], [address] Visits Status Start Date End Date 1 Authorized 4/24/18 4/24/19 If your referral has a status of pending review or denied, additional information will be sent to support the outcome of this decision. Introducing [facility]! Dear Yaneth Greene: Thank you for requesting a [employer] account. Our records indicate that you already have an active [email or web address] account. You can access your account anytime at [email or web address] Did you know that you can access your hospital and ER discharge instructions at any time in [email or web address]? You can also review all of your test results from your hospital stay or ER visit. Additional Information If you have questions, please visit the Frequently Asked Questions section of the [email or web address] website at [email or web address] Remember, [email or web address] is NOT to be used for urgent needs. For medical emergencies, dial 911. Now available from your iPhone and Android! Please provide this summary of care documentation to your next provider. Your primary care clinician is listed as Sarath Spears. If you have any questions after today's visit, please call [phone].

## 2018-04-24 NOTE — PROGRESS NOTES
Chief Complaint   Patient presents with    Cholesterol Problem     3 mth f/u    Hypertension     3 mth f/u    Medication Evaluation     he is a 76y.o. year old male who presents for evalution. He has been checking bp at home  Before and after exercise and the BP is better after in the 130s . He is having mre nightmares and he thinks his meds are causing it  He takes the meds at bedtime  Getting a tooth transplant and needs the clearance sent to dentist    Reviewed PmHx, RxHx, FmHx, SocHx, AllgHx and updated and dated in the chart. Aspirin yes ____   No____ N/A____    Patient Active Problem List    Diagnosis    Hyperlipidemia    [facility] [address]    Essential hypertension       Nurse notes were reviewed and copied and are correct  Review of Systems - negative except as listed above in the HPI    Objective:     Vitals:    04/24/18 1041   BP: 150/71   Pulse: 85   Resp: 17   Temp: 98.2 °F (36.8 °C)   TempSrc: Oral   SpO2: 95%   Weight: 184 lb (83.5 kg)   Height: 5' 8\" (1.727 m)        Physical Examination: General appearance - alert, well appearing, and in no distress  Mental status - alert, oriented to person, place, and time  Neck - supple, no significant adenopathy  Lymphatics - no palpable lymphadenopathy, no hepatosplenomegaly  Chest - clear to auscultation, no wheezes, rales or rhonchi, symmetric air entry  Heart - normal rate, regular rhythm, normal S1, S2, no murmurs, rubs, clicks or gallops  Abdomen - soft, nontender, nondistended, no masses or organomegaly  Musculoskeletal - no joint tenderness, deformity or swelling  Extremities - peripheral pulses normal, no pedal edema, no clubbing or cyanosis  Skin - normal coloration and turgor, no rashes, no suspicious skin lesions noted      Assessment/ Plan:   Diagnoses and all orders for this visit:    1. Prostate cancer ([facility] [address]  -     REFERRAL TO UROLOGY  -     PSA W/ REFLX FREE PSA    2.  Hyperlipidemia, unspecified hyperlipidemia type  -     LIPID PANEL    3. Essential hypertension  -     METABOLIC PANEL, COMPREHENSIVE    Other orders  -     CVD REPORT  -     CKD REPORT  -     PSA, FREE       Follow-up Disposition:  Return in about 3 months (around 7/24/2018). ICD-10-CM ICD-9-CM    1. Prostate cancer ([facility] [address] C61 185 REFERRAL TO UROLOGY      PSA W/ REFLX FREE PSA   2. Hyperlipidemia, unspecified hyperlipidemia type E78.5 272.4 LIPID PANEL   3. Essential hypertension Y84 795.2 METABOLIC PANEL, COMPREHENSIVE     Switch meds to am for a week and then try one at a time at bedtime to see if one of them causes the nightmares to return      I have discussed the diagnosis with the patient and the intended plan as seen in the above orders. The patient has received an after-visit summary and questions were answered concerning future plans. Medication Side Effects and Warnings were discussed with patient: yes  Patient Labs were reviewed and or requested: yes  Patient Past Records were reviewed and or requested: yes        There are no Patient Instructions on file for this visit.     The patient verbalizes understanding and agrees with the plan of care        Patient has the advanced directives booklet to review

## 2018-04-25 LAB
ALBUMIN SERPL-MCNC: 4.6 G/DL (ref 3.5–4.8)
ALBUMIN/GLOB SERPL: 1.7 {RATIO} (ref 1.2–2.2)
ALP SERPL-CCNC: 62 IU/L (ref 39–117)
ALT SERPL-CCNC: 13 IU/L (ref 0–44)
AST SERPL-CCNC: 18 IU/L (ref 0–40)
BILIRUB SERPL-MCNC: 0.4 MG/DL (ref 0–1.2)
BUN SERPL-MCNC: 14 MG/DL (ref 8–27)
BUN/CREAT SERPL: 11 (ref 10–24)
CALCIUM SERPL-MCNC: 9.8 MG/DL (ref 8.6–10.2)
CHLORIDE SERPL-SCNC: 103 MMOL/L (ref 96–106)
CHOLEST SERPL-MCNC: 230 MG/DL (ref 100–199)
CO2 SERPL-SCNC: 24 MMOL/L (ref 18–29)
CREAT SERPL-MCNC: 1.27 MG/DL (ref 0.76–1.27)
GFR SERPLBLD CREATININE-BSD FMLA CKD-EPI: 53 ML/MIN/1.73
GFR SERPLBLD CREATININE-BSD FMLA CKD-EPI: 62 ML/MIN/1.73
GLOBULIN SER CALC-MCNC: 2.7 G/DL (ref 1.5–4.5)
GLUCOSE SERPL-MCNC: 98 MG/DL (ref 65–99)
HDLC SERPL-MCNC: 67 MG/DL
INTERPRETATION, 910389: NORMAL
INTERPRETATION: NORMAL
LDLC SERPL CALC-MCNC: 152 MG/DL (ref 0–99)
PDF IMAGE, 910387: NORMAL
POTASSIUM SERPL-SCNC: 4.8 MMOL/L (ref 3.5–5.2)
PROT SERPL-MCNC: 7.3 G/DL (ref 6–8.5)
PSA FREE MFR SERPL: 31.8 %
PSA FREE SERPL-MCNC: 2.86 NG/ML
PSA SERPL-MCNC: 9 NG/ML (ref 0–4)
REFLEX CRITERIA: ABNORMAL
SODIUM SERPL-SCNC: 144 MMOL/L (ref 134–144)
TRIGL SERPL-MCNC: 54 MG/DL (ref 0–149)
VLDLC SERPL CALC-MCNC: 11 MG/DL (ref 5–40)

## 2018-05-14 NOTE — PROGRESS NOTES
The liver and kidney are normal  The psa is still in the 8-9 range. This is stable but it is high.    Keep the plan of following up with Dr Gross(urology)

## 2018-05-15 NOTE — PROGRESS NOTES
Spoke with pt and advised of lab results and recommendations. Pt verbalized understanding and no further questions.

## 2019-01-05 ENCOUNTER — HOSPITAL ENCOUNTER (EMERGENCY)
Age: 79
Discharge: HOME | End: 2019-01-05
Attending: EMERGENCY MEDICINE
Payer: MEDICARE

## 2019-01-05 ENCOUNTER — APPOINTMENT (OUTPATIENT)
Dept: GENERAL RADIOLOGY | Age: 79
End: 2019-01-05
Attending: EMERGENCY MEDICINE
Payer: MEDICARE

## 2019-01-05 VITALS
TEMPERATURE: 98.2 F | BODY MASS INDEX: 26.52 KG/M2 | RESPIRATION RATE: 18 BRPM | OXYGEN SATURATION: 97 % | SYSTOLIC BLOOD PRESSURE: 191 MMHG | HEART RATE: 82 BPM | DIASTOLIC BLOOD PRESSURE: 94 MMHG | HEIGHT: 68 IN | WEIGHT: 175 LBS

## 2019-01-05 DIAGNOSIS — J06.9 ACUTE URI: Primary | ICD-10-CM

## 2019-01-05 PROCEDURE — 71046 X-RAY EXAM CHEST 2 VIEWS: CPT

## 2019-01-05 PROCEDURE — 99282 EMERGENCY DEPT VISIT SF MDM: CPT

## 2019-01-05 RX ORDER — PREDNISONE 20 MG/1
20 TABLET ORAL DAILY
Qty: 5 TAB | Refills: 0 | Status: SHIPPED | OUTPATIENT
Start: 2019-01-05 | End: 2019-01-10

## 2019-01-05 RX ORDER — ALBUTEROL SULFATE 90 UG/1
2 AEROSOL, METERED RESPIRATORY (INHALATION)
Qty: 1 INHALER | Refills: 1 | Status: SHIPPED | OUTPATIENT
Start: 2019-01-05 | End: 2019-01-12

## 2019-01-05 NOTE — DISCHARGE INSTRUCTIONS
Patient Education        Upper Respiratory Infection (Cold): Care Instructions  Your Care Instructions    An upper respiratory infection, or URI, is an infection of the nose, sinuses, or throat. URIs are spread by coughs, sneezes, and direct contact. The common cold is the most frequent kind of URI. The flu and sinus infections are other kinds of URIs. Almost all URIs are caused by viruses. Antibiotics won't cure them. But you can treat most infections with home care. This may include drinking lots of fluids and taking over-the-counter pain medicine. You will probably feel better in 4 to 10 days. The doctor has checked you carefully, but problems can develop later. If you notice any problems or new symptoms, get medical treatment right away. Follow-up care is a key part of your treatment and safety. Be sure to make and go to all appointments, and call your doctor if you are having problems. It's also a good idea to know your test results and keep a list of the medicines you take. How can you care for yourself at home? · To prevent dehydration, drink plenty of fluids, enough so that your urine is light yellow or clear like water. Choose water and other caffeine-free clear liquids until you feel better. If you have kidney, heart, or liver disease and have to limit fluids, talk with your doctor before you increase the amount of fluids you drink. · Take an over-the-counter pain medicine, such as acetaminophen (Tylenol), ibuprofen (Advil, Motrin), or naproxen (Aleve). Read and follow all instructions on the label. · Before you use cough and cold medicines, check the label. These medicines may not be safe for young children or for people with certain health problems. · Be careful when taking over-the-counter cold or flu medicines and Tylenol at the same time. Many of these medicines have acetaminophen, which is Tylenol. Read the labels to make sure that you are not taking more than the recommended dose.  Too much acetaminophen (Tylenol) can be harmful. · Get plenty of rest.  · Do not smoke or allow others to smoke around you. If you need help quitting, talk to your doctor about stop-smoking programs and medicines. These can increase your chances of quitting for good. When should you call for help? Call 911 anytime you think you may need emergency care. For example, call if:    · You have severe trouble breathing.    Call your doctor now or seek immediate medical care if:    · You seem to be getting much sicker.     · You have new or worse trouble breathing.     · You have a new or higher fever.     · You have a new rash.    Watch closely for changes in your health, and be sure to contact your doctor if:    · You have a new symptom, such as a sore throat, an earache, or sinus pain.     · You cough more deeply or more often, especially if you notice more mucus or a change in the color of your mucus.     · You do not get better as expected. Where can you learn more? Go to http://yehuda-goldie.info/. Enter Y183 in the search box to learn more about \"Upper Respiratory Infection (Cold): Care Instructions. \"  Current as of: December 6, 2017  Content Version: 11.8  © 1829-8679 [employer]. Care instructions adapted under license by [employer] (which disclaims liability or warranty for this information). If you have questions about a medical condition or this instruction, always ask your healthcare professional. [place] [address] any warranty or liability for your use of this information.

## 2019-01-17 ENCOUNTER — OFFICE VISIT (OUTPATIENT)
Dept: FAMILY MEDICINE CLINIC | Age: 79
End: 2019-01-17

## 2019-01-17 VITALS
DIASTOLIC BLOOD PRESSURE: 54 MMHG | TEMPERATURE: 98.7 F | HEIGHT: 68 IN | SYSTOLIC BLOOD PRESSURE: 96 MMHG | HEART RATE: 94 BPM | RESPIRATION RATE: 16 BRPM | WEIGHT: 180 LBS | OXYGEN SATURATION: 96 % | BODY MASS INDEX: 27.28 KG/M2

## 2019-01-17 DIAGNOSIS — E78.5 HYPERLIPIDEMIA, UNSPECIFIED HYPERLIPIDEMIA TYPE: ICD-10-CM

## 2019-01-17 DIAGNOSIS — Z00.00 MEDICARE ANNUAL WELLNESS VISIT, SUBSEQUENT: Primary | ICD-10-CM

## 2019-01-17 DIAGNOSIS — I10 ESSENTIAL HYPERTENSION: ICD-10-CM

## 2019-01-17 DIAGNOSIS — Z71.89 ADVANCED DIRECTIVES, COUNSELING/DISCUSSION: ICD-10-CM

## 2019-01-17 DIAGNOSIS — C61 PROSTATE CANCER (HCC): ICD-10-CM

## 2019-01-17 NOTE — PROGRESS NOTES
1. Have you been to the ER, urgent care clinic since your last visit? Hospitalized since your last visit? [facility] on 1/5/19 for Acute URI    2. Have you seen or consulted any other health care providers outside of the [address] [place] [address] since your last visit? Include any pap smears or colon screening. No    Chief Complaint   Patient presents with   Dawn ED Follow-up     [facility]  productive cough   [employer] Visit

## 2019-01-17 NOTE — ACP (ADVANCE CARE PLANNING)
Advance Care Planning    Advance Care Planning (ACP) Provider Conversation Snapshot    Date of ACP Conversation: 01/17/19  Persons included in Conversation:  patient  Length of ACP Conversation in minutes:  16 minutes    Authorized Decision Maker (if patient is incapable of making informed decisions): This person is: Other Legally Authorized Decision Maker (e.g. Next of Kin)          For Patients with Decision Making Capacity:   Values/Goals: Exploration of values, goals, and preferences if recovery is not expected, even with continued medical treatment in the event of:  Imminent death  Severe, permanent brain injury  \"In these circumstances, what matters most to you? \"  Care focused more on comfort or quality of life.     Conversation Outcomes / Follow-Up Plan:   Reviewed existing Advance Directive

## 2019-01-17 NOTE — PROGRESS NOTES
This is the Subsequent Medicare Annual Wellness Exam, performed 12 months or more after the Initial AWV or the last Subsequent AWV    I have reviewed the patient's medical history in detail and updated the computerized patient record. History     Past Medical History:   Diagnosis Date    Constipation     Cyst of right kidney     froze    Enlarged prostate     [facility] [address]     Hemorrhoids     Hypertension       Past Surgical History:   Procedure Laterality Date    HX OTHER SURGICAL      right kidney - freeze     Current Outpatient Medications   Medication Sig Dispense Refill    guaiFENesin (MUCINEX) 1,200 mg Ta12 ER tablet Take 1 Tab by mouth two (2) times a day. 12 Tab 0    felodipine (PLENDIL SR) 10 mg 24 hr tablet TAKE 1 TABLET BY MOUTH DAILY 30 Tab 11    doxazosin (CARDURA) 8 mg tablet TAKE 1 TABLET BY MOUTH DAILY 30 Tab 11    enalapril (VASOTEC) 20 mg tablet TAKE 1 TABLET BY MOUTH DAILY 30 Tab 11    LUMIGAN 0.01 % ophthalmic drops INT 1 GTT IN OU QD HS  3    fluticasone (FLONASE) 50 mcg/actuation nasal spray nightly.  chlorhexidine (PERIDEX) 0.12 % solution RINSE WITH 1/2 OUNCE BID AFTER BRUSHING  99    amoxicillin (AMOXIL) 500 mg capsule TK 1 C PO TID . START THE DAY BEFORE SURGERY  0     No Known Allergies  Family History   Problem Relation Age of Onset   [address] Hypertension Mother     Hypertension Father      Social History     Tobacco Use    Smoking status: Passive Smoke Exposure - Never Smoker    Smokeless tobacco: Never Used   Substance Use Topics    Alcohol use: No     Patient Active Problem List   Diagnosis Code    Prostate cancer ([facility] [address] C61    Essential hypertension I10    Hyperlipidemia E78.5       Depression Risk Factor Screening:     PHQ over the last two weeks 1/17/2019   Little interest or pleasure in doing things Not at all   Feeling down, depressed, irritable, or hopeless Not at all   Total Score PHQ 2 0     Alcohol Risk Factor Screening:    You do not drink alcohol or very rarely. Functional Ability and Level of Safety:   Hearing Loss  Hearing is good. Activities of Daily Living  The home contains: no safety equipment. Patient does total self care    Fall Risk  Fall Risk Assessment, last 12 mths 1/17/2019   Able to walk? Yes   Fall in past 12 months? No       Abuse Screen  Patient is not abused    Cognitive Screening   Evaluation of Cognitive Function:  Has your family/caregiver stated any concerns about your memory: no  Normal, Clock Drawing test    Patient Care Team   Patient Care Team:  Jose A Caldwell MD as PCP - General (Family Practice)    Assessment/Plan   Education and counseling provided:  Are appropriate based on today's review and evaluation  End-of-Life planning (with patient's consent)  Pneumococcal Vaccine    Diagnoses and all orders for this visit:    1. Medicare annual wellness visit, subsequent    2. Advanced directives, counseling/discussion  -     ADVANCE CARE PLANNING FIRST [address] Maintenance Due   Topic Date Due    Shingrix Vaccine Age 49> (1 of 2) 06/29/1988       Chief Complaint   Patient presents with   [facility] ED [facility] productive cough    Annual Wellness Visit     he is a [de-identified]y.o. year old male who presents for evalution. He has htn and high cholx  He needs labs today  Also has a h/o prostate CA   I referred him to urology to continue annual monitoring  h has not made the appt. He say he ill get that done soon  Reviewed PmHx, RxHx, FmHx, SocHx, AllgHx and updated and dated in the chart.     Aspirin yes ____   No____ N/A____    Patient Active Problem List    Diagnosis    Hyperlipidemia    Prostate cancer ([facility] [address]    Essential hypertension       Nurse notes were reviewed and copied and are correct  Review of Systems - negative except as listed above in the HPI    Objective:     Vitals:    01/17/19 1305   BP: 96/54   Pulse: 94   Resp: 16   Temp: 98.7 °F (37.1 °C)   TempSrc: Oral   SpO2: 96%   Weight: 180 lb (81.6 kg)   Height: 5' 8\" (1.727 m)     Physical Examination: General appearance - alert, well appearing, and in no distress  Mental status - alert, oriented to person, place, and time  Neck - supple, no significant adenopathy  Chest - clear to auscultation, no wheezes, rales or rhonchi, symmetric air entry  Heart - normal rate, regular rhythm, normal S1, S2, no murmurs, rubs, clicks or gallops  Abdomen - soft, nontender, nondistended, no masses or organomegaly  Musculoskeletal - no joint tenderness, deformity or swelling  Extremities - peripheral pulses normal, no pedal edema, no clubbing or cyanosis         Assessment/ Plan:   Diagnoses and all orders for this visit:      1. Essential hypertension  -     METABOLIC PANEL, COMPREHENSIVE      3. Prostate cancer [facility])  Make appt w urology  4. Hyperlipidemia, unspecified hyperlipidemia type  -     LIPID PANEL    Other orders  -     CVD REPORT       Follow-up Disposition: Not on File    ICD-10-CM ICD-9-CM    1. Medicare annual wellness visit, subsequent Z00.00 V70.0    2. Advanced directives, counseling/discussion Z71.89 V65.49 ADVANCE CARE PLANNING FIRST 30 MINS   3. Essential hypertension O24 659.8 METABOLIC PANEL, COMPREHENSIVE   4. Prostate cancer ([facility] [address] C61 185    5. Hyperlipidemia, unspecified hyperlipidemia type E78.5 272.4 LIPID PANEL       I have discussed the diagnosis with the patient and the intended plan as seen in the above orders. The patient has received an after-visit summary and questions were answered concerning future plans. Medication Side Effects and Warnings were discussed with patient: yes  Patient Labs were reviewed and or requested: yes  Patient Past Records were reviewed and or requested: yes        Patient Instructions       Medicare Wellness Visit, Male    The best way to live healthy is to have a lifestyle where you eat a well-balanced diet, exercise regularly, limit alcohol use, and quit all forms of tobacco/nicotine, if applicable. Regular preventive services are another way to keep healthy. Preventive services (vaccines, screening tests, monitoring & exams) can help personalize your care plan, which helps you manage your own care. Screening tests can find health problems at the earliest stages, when they are easiest to treat. [address] follows the current, evidence-based guidelines published by the [facility] Vishal Lagunas ([facility]) when recommending preventive services for our patients. Because we follow these guidelines, sometimes recommendations change over time as research supports it. (For example, a prostate screening blood test is no longer routinely recommended for men with no symptoms.)  Of course, you and your doctor may decide to screen more often for some diseases, based on your risk and co-morbidities (chronic disease you are already diagnosed with). Preventive services for you include:  - Medicare offers their members a free annual wellness visit, which is time for you and your primary care provider to discuss and plan for your preventive service needs. Take advantage of this benefit every year!  -All adults over age 72 should receive the recommended pneumonia vaccines. Current USPSTF guidelines recommend a series of two vaccines for the best pneumonia protection.   -All adults should have a flu vaccine yearly and an ECG.  All adults age 61 and older should receive a shingles vaccine once in their lifetime.    -All adults age 38-68 who are overweight should have a diabetes screening test once every three years.   -Other screening tests & preventive services for persons with diabetes include: an eye exam to screen for diabetic retinopathy, a kidney function test, a foot exam, and stricter control over your cholesterol.   -Cardiovascular screening for adults with routine risk involves an electrocardiogram (ECG) at intervals determined by the provider.   -Colorectal cancer screening should be done for adults age 54-65 with no increased risk factors for colorectal cancer. There are a number of acceptable methods of screening for this type of cancer. Each test has its own benefits and drawbacks. Discuss with your provider what is most appropriate for you during your annual wellness visit. The different tests include: colonoscopy (considered the best screening method), a fecal occult blood test, a fecal DNA test, and sigmoidoscopy.  -All adults born between [facility] should be screened once for Hepatitis C.  -An Abdominal Aortic Aneurysm (AAA) Screening is recommended for men age 73-68 who has ever smoked in their lifetime.      Here is a list of your current Health Maintenance items (your personalized list of preventive services) with a due date:  Health Maintenance Due   Topic Date Due    Shingles Vaccine (1 of 2) 06/29/1988    Pneumococcal Vaccine (1 of 2 - PCV13) 06/29/2003    Annual Well Visit  03/14/2018       The patient verbalizes understanding and agrees with the plan of care        Patient has the advanced directives booklet to review

## 2019-01-17 NOTE — PATIENT INSTRUCTIONS
Medicare Wellness Visit, Male    The best way to live healthy is to have a lifestyle where you eat a well-balanced diet, exercise regularly, limit alcohol use, and quit all forms of tobacco/nicotine, if applicable. Regular preventive services are another way to keep healthy. Preventive services (vaccines, screening tests, monitoring & exams) can help personalize your care plan, which helps you manage your own care. Screening tests can find health problems at the earliest stages, when they are easiest to treat. [facility] follows the current, evidence-based guidelines published by the [facility]ishal Bebo ([facility]) when recommending preventive services for our patients. Because we follow these guidelines, sometimes recommendations change over time as research supports it. (For example, a prostate screening blood test is no longer routinely recommended for men with no symptoms.)  Of course, you and your doctor may decide to screen more often for some diseases, based on your risk and co-morbidities (chronic disease you are already diagnosed with). Preventive services for you include:  - Medicare offers their members a free annual wellness visit, which is time for you and your primary care provider to discuss and plan for your preventive service needs. Take advantage of this benefit every year!  -All adults over age 72 should receive the recommended pneumonia vaccines. Current USPSTF guidelines recommend a series of two vaccines for the best pneumonia protection.   -All adults should have a flu vaccine yearly and an ECG.  All adults age 61 and older should receive a shingles vaccine once in their lifetime.    -All adults age 38-68 who are overweight should have a diabetes screening test once every three years.   -Other screening tests & preventive services for persons with diabetes include: an eye exam to screen for diabetic retinopathy, a kidney function test, a foot exam, and stricter control over your cholesterol.   -Cardiovascular screening for adults with routine risk involves an electrocardiogram (ECG) at intervals determined by the provider.   -Colorectal cancer screening should be done for adults age 54-65 with no increased risk factors for colorectal cancer. There are a number of acceptable methods of screening for this type of cancer. Each test has its own benefits and drawbacks. Discuss with your provider what is most appropriate for you during your annual wellness visit. The different tests include: colonoscopy (considered the best screening method), a fecal occult blood test, a fecal DNA test, and sigmoidoscopy.  -All adults born between [facility] should be screened once for Hepatitis C.  -An Abdominal Aortic Aneurysm (AAA) Screening is recommended for men age 73-68 who has ever smoked in their lifetime.      Here is a list of your current Health Maintenance items (your personalized list of preventive services) with a due date:  Health Maintenance Due   Topic Date Due    Shingles Vaccine (1 of 2) 06/29/1988    Pneumococcal Vaccine (1 of 2 - PCV13) 06/29/2003    Annual Well Visit  03/14/2018

## 2019-01-18 LAB
ALBUMIN SERPL-MCNC: 4.5 G/DL (ref 3.5–4.7)
ALBUMIN/GLOB SERPL: 1.7 {RATIO} (ref 1.2–2.2)
ALP SERPL-CCNC: 67 IU/L (ref 39–117)
ALT SERPL-CCNC: 9 IU/L (ref 0–44)
AST SERPL-CCNC: 14 IU/L (ref 0–40)
BILIRUB SERPL-MCNC: 0.3 MG/DL (ref 0–1.2)
BUN SERPL-MCNC: 13 MG/DL (ref 8–27)
BUN/CREAT SERPL: 11 (ref 10–24)
CALCIUM SERPL-MCNC: 10.1 MG/DL (ref 8.6–10.2)
CHLORIDE SERPL-SCNC: 106 MMOL/L (ref 96–106)
CHOLEST SERPL-MCNC: 211 MG/DL (ref 100–199)
CO2 SERPL-SCNC: 21 MMOL/L (ref 20–29)
CREAT SERPL-MCNC: 1.15 MG/DL (ref 0.76–1.27)
GLOBULIN SER CALC-MCNC: 2.6 G/DL (ref 1.5–4.5)
GLUCOSE SERPL-MCNC: 127 MG/DL (ref 65–99)
HDLC SERPL-MCNC: 62 MG/DL
INTERPRETATION, 910389: NORMAL
LDLC SERPL CALC-MCNC: 136 MG/DL (ref 0–99)
POTASSIUM SERPL-SCNC: 4.1 MMOL/L (ref 3.5–5.2)
PROT SERPL-MCNC: 7.1 G/DL (ref 6–8.5)
SODIUM SERPL-SCNC: 144 MMOL/L (ref 134–144)
TRIGL SERPL-MCNC: 65 MG/DL (ref 0–149)
VLDLC SERPL CALC-MCNC: 13 MG/DL (ref 5–40)

## 2019-01-27 NOTE — PROGRESS NOTES
The cholesterol is a little better.  Keep working on avoiding fried foods, junk foods and fast foods  The liver and kidney tests are normal  We will continue to monitor these

## 2019-02-21 DIAGNOSIS — I10 ESSENTIAL HYPERTENSION: ICD-10-CM

## 2019-02-25 RX ORDER — ENALAPRIL MALEATE 20 MG/1
TABLET ORAL
Qty: 90 TAB | Refills: 3 | Status: SHIPPED | OUTPATIENT
Start: 2019-02-25 | End: 2019-09-19 | Stop reason: SDUPTHER

## 2019-02-25 RX ORDER — DOXAZOSIN 8 MG/1
TABLET ORAL
Qty: 90 TAB | Refills: 3 | Status: SHIPPED | OUTPATIENT
Start: 2019-02-25 | End: 2019-08-28 | Stop reason: SDUPTHER

## 2019-07-10 DIAGNOSIS — I10 ESSENTIAL HYPERTENSION: ICD-10-CM

## 2019-07-12 RX ORDER — FELODIPINE 10 MG/1
TABLET, EXTENDED RELEASE ORAL
Qty: 30 TAB | Refills: 0 | Status: SHIPPED | OUTPATIENT
Start: 2019-07-12 | End: 2019-09-19 | Stop reason: SDUPTHER

## 2019-08-28 DIAGNOSIS — I10 ESSENTIAL HYPERTENSION: ICD-10-CM

## 2019-08-29 RX ORDER — DOXAZOSIN 8 MG/1
TABLET ORAL
Qty: 90 TAB | Refills: 3 | Status: SHIPPED | OUTPATIENT
Start: 2019-08-29 | End: 2019-09-19 | Stop reason: SDUPTHER

## 2019-09-19 ENCOUNTER — OFFICE VISIT (OUTPATIENT)
Dept: FAMILY MEDICINE CLINIC | Age: 79
End: 2019-09-19

## 2019-09-19 VITALS
OXYGEN SATURATION: 96 % | SYSTOLIC BLOOD PRESSURE: 153 MMHG | WEIGHT: 185 LBS | BODY MASS INDEX: 28.04 KG/M2 | HEIGHT: 68 IN | RESPIRATION RATE: 19 BRPM | DIASTOLIC BLOOD PRESSURE: 77 MMHG | TEMPERATURE: 98.1 F | HEART RATE: 75 BPM

## 2019-09-19 DIAGNOSIS — C61 PROSTATE CANCER (HCC): Primary | ICD-10-CM

## 2019-09-19 DIAGNOSIS — I10 ESSENTIAL HYPERTENSION: ICD-10-CM

## 2019-09-19 DIAGNOSIS — E78.5 HYPERLIPIDEMIA, UNSPECIFIED HYPERLIPIDEMIA TYPE: ICD-10-CM

## 2019-09-19 RX ORDER — ENALAPRIL MALEATE 20 MG/1
TABLET ORAL
Qty: 30 TAB | Refills: 11 | Status: SHIPPED | OUTPATIENT
Start: 2019-09-19 | End: 2020-10-07 | Stop reason: SDUPTHER

## 2019-09-19 RX ORDER — DOXAZOSIN 8 MG/1
TABLET ORAL
Qty: 30 TAB | Refills: 11 | Status: SHIPPED | OUTPATIENT
Start: 2019-09-19 | End: 2020-12-21 | Stop reason: SDUPTHER

## 2019-09-19 RX ORDER — FELODIPINE 10 MG/1
TABLET, EXTENDED RELEASE ORAL
Qty: 30 TAB | Refills: 11 | Status: SHIPPED | OUTPATIENT
Start: 2019-09-19 | End: 2020-10-07 | Stop reason: SDUPTHER

## 2019-09-19 RX ORDER — FELODIPINE 10 MG/1
TABLET, EXTENDED RELEASE ORAL
Qty: 30 TAB | Refills: 11 | Status: SHIPPED | OUTPATIENT
Start: 2019-09-19 | End: 2019-09-19 | Stop reason: SDUPTHER

## 2019-09-19 NOTE — PROGRESS NOTES
Chief Complaint   Patient presents with    Hypertension    Cholesterol Problem     he is a 78y.o. year old male who presents for evalution. His bp was normal this morning  The first check today was high  No chest pain or SOB  He still exercises regularly  He had cheese and crackers before coming in    Reviewed PmHx, RxHx, FmHx, SocHx, AllgHx and updated and dated in the chart. Aspirin yes ____   No____ N/A____    Patient Active Problem List    Diagnosis    Hyperlipidemia    Prostate cancer ([facility]    Essential hypertension       Nurse notes were reviewed and copied and are correct  Review of Systems - negative except as listed above in the HPI    Objective:     Vitals:    09/19/19 1419   BP: 153/77   Pulse: 75   Resp: 19   Temp: 98.1 °F (36.7 °C)   TempSrc: Oral   SpO2: 96%   Weight: 185 lb (83.9 kg)   Height: 5' 8\" (1.727 m)     Physical Examination: General appearance - alert, well appearing, and in no distress  Mental status - alert, oriented to person, place, and time  Neck - supple, no significant adenopathy  Chest - clear to auscultation, no wheezes, rales or rhonchi, symmetric air entry  Heart - normal rate, regular rhythm, normal S1, S2, no murmurs, rubs, clicks or gallops  Musculoskeletal - no joint tenderness, deformity or swelling  Extremities - peripheral pulses normal, no pedal edema, no clubbing or cyanosis  Skin - normal coloration and turgor, no rashes, no suspicious skin lesions noted           Assessment/ Plan:   Diagnoses and all orders for this visit:    1. Prostate cancer ([facility]  -     REFERRAL TO UROLOGY  -     PSA W/ REFLX FREE PSA    2. Essential hypertension  -     felodipine (PLENDIL SR) 10 mg 24 hr tablet; TAKE 1 TABLET BY MOUTH DAILY  -     doxazosin (CARDURA) 8 mg tablet; TAKE 1 TABLET BY MOUTH DAILY  -     enalapril (VASOTEC) 20 mg tablet; TAKE 1 TABLET BY MOUTH DAILY  -     METABOLIC PANEL, COMPREHENSIVE    3.  Hyperlipidemia, unspecified hyperlipidemia type  -     LIPID PANEL    he refuses all vaccines  He says he is 78 and if it aint broke dont fix it         ICD-10-CM ICD-9-CM    1. Prostate cancer ([facility] C61 185 REFERRAL TO UROLOGY      PSA W/ REFLX FREE PSA   2. Essential hypertension I10 401.9 felodipine (PLENDIL SR) 10 mg 24 hr tablet      doxazosin (CARDURA) 8 mg tablet      enalapril (VASOTEC) 20 mg tablet      METABOLIC PANEL, COMPREHENSIVE      DISCONTINUED: felodipine (PLENDIL SR) 10 mg 24 hr tablet   3. Hyperlipidemia, unspecified hyperlipidemia type E78.5 272.4 LIPID PANEL       I have discussed the diagnosis with the patient and the intended plan as seen in the above orders. The patient has received an after-visit summary and questions were answered concerning future plans. Medication Side Effects and Warnings were discussed with patient: yes  Patient Labs were reviewed and or requested: yes  Patient Past Records were reviewed and or requested: yes        There are no Patient Instructions on file for this visit.     The patient verbalizes understanding and agrees with the plan of care        Patient has the advanced directives booklet to review

## 2019-09-19 NOTE — PROGRESS NOTES
1. Have you been to the ER, urgent care clinic since your last visit? Hospitalized since your last visit? No    2. Have you seen or consulted any other health care providers outside of the [address] [place] [address] since your last visit? Include any pap smears or colon screening. No     Chief Complaint   Patient presents with    Hypertension    Cholesterol Problem       Pt requesting Rx's be filled for 30 day supply only.

## 2019-09-20 LAB
ALBUMIN SERPL-MCNC: 4.5 G/DL (ref 3.5–4.7)
ALBUMIN/GLOB SERPL: 1.9 {RATIO} (ref 1.2–2.2)
ALP SERPL-CCNC: 68 IU/L (ref 39–117)
ALT SERPL-CCNC: 10 IU/L (ref 0–44)
AST SERPL-CCNC: 17 IU/L (ref 0–40)
BILIRUB SERPL-MCNC: 0.2 MG/DL (ref 0–1.2)
BUN SERPL-MCNC: 12 MG/DL (ref 8–27)
BUN/CREAT SERPL: 10 (ref 10–24)
CALCIUM SERPL-MCNC: 10 MG/DL (ref 8.6–10.2)
CHLORIDE SERPL-SCNC: 105 MMOL/L (ref 96–106)
CHOLEST SERPL-MCNC: 211 MG/DL (ref 100–199)
CO2 SERPL-SCNC: 27 MMOL/L (ref 20–29)
CREAT SERPL-MCNC: 1.25 MG/DL (ref 0.76–1.27)
GLOBULIN SER CALC-MCNC: 2.4 G/DL (ref 1.5–4.5)
GLUCOSE SERPL-MCNC: 90 MG/DL (ref 65–99)
HDLC SERPL-MCNC: 67 MG/DL
INTERPRETATION, 910389: NORMAL
INTERPRETATION: NORMAL
LDLC SERPL CALC-MCNC: 124 MG/DL (ref 0–99)
PDF IMAGE, 910387: NORMAL
POTASSIUM SERPL-SCNC: 4.9 MMOL/L (ref 3.5–5.2)
PROT SERPL-MCNC: 6.9 G/DL (ref 6–8.5)
PSA SERPL-MCNC: 11 NG/ML (ref 0–4)
REFLEX CRITERIA: ABNORMAL
SODIUM SERPL-SCNC: 144 MMOL/L (ref 134–144)
TRIGL SERPL-MCNC: 98 MG/DL (ref 0–149)
VLDLC SERPL CALC-MCNC: 20 MG/DL (ref 5–40)

## 2019-10-04 NOTE — PROGRESS NOTES
The cholesterol is improved  The liver and kidney are normal  The prostate test is higher- psa is now 11.  Follow up with the urologist

## 2020-12-21 ENCOUNTER — DOCUMENTATION ONLY (OUTPATIENT)
Dept: FAMILY MEDICINE CLINIC | Age: 80
End: 2020-12-21

## 2020-12-21 DIAGNOSIS — I10 ESSENTIAL HYPERTENSION: ICD-10-CM

## 2020-12-23 RX ORDER — DOXAZOSIN 8 MG/1
TABLET ORAL
Qty: 30 TAB | Refills: 11 | Status: SHIPPED | OUTPATIENT
Start: 2020-12-23 | End: 2022-01-26

## 2020-12-28 DIAGNOSIS — C61 PROSTATE CANCER (HCC): ICD-10-CM

## 2020-12-28 DIAGNOSIS — E78.5 HYPERLIPIDEMIA, UNSPECIFIED HYPERLIPIDEMIA TYPE: ICD-10-CM

## 2020-12-28 DIAGNOSIS — I10 ESSENTIAL HYPERTENSION: Primary | ICD-10-CM

## 2021-02-03 ENCOUNTER — OFFICE VISIT (OUTPATIENT)
Dept: FAMILY MEDICINE CLINIC | Age: 81
End: 2021-02-03
Payer: MEDICARE

## 2021-02-03 VITALS
TEMPERATURE: 97.2 F | OXYGEN SATURATION: 95 % | HEIGHT: 68 IN | WEIGHT: 182 LBS | HEART RATE: 86 BPM | BODY MASS INDEX: 27.58 KG/M2 | RESPIRATION RATE: 16 BRPM | DIASTOLIC BLOOD PRESSURE: 73 MMHG | SYSTOLIC BLOOD PRESSURE: 136 MMHG

## 2021-02-03 DIAGNOSIS — E78.5 HYPERLIPIDEMIA, UNSPECIFIED HYPERLIPIDEMIA TYPE: ICD-10-CM

## 2021-02-03 DIAGNOSIS — C61 PROSTATE CANCER (HCC): ICD-10-CM

## 2021-02-03 DIAGNOSIS — I10 ESSENTIAL HYPERTENSION: Primary | ICD-10-CM

## 2021-02-03 PROCEDURE — G8752 SYS BP LESS 140: HCPCS | Performed by: FAMILY MEDICINE

## 2021-02-03 PROCEDURE — G8510 SCR DEP NEG, NO PLAN REQD: HCPCS | Performed by: FAMILY MEDICINE

## 2021-02-03 PROCEDURE — G8419 CALC BMI OUT NRM PARAM NOF/U: HCPCS | Performed by: FAMILY MEDICINE

## 2021-02-03 PROCEDURE — 99214 OFFICE O/P EST MOD 30 MIN: CPT | Performed by: FAMILY MEDICINE

## 2021-02-03 PROCEDURE — G8536 NO DOC ELDER MAL SCRN: HCPCS | Performed by: FAMILY MEDICINE

## 2021-02-03 PROCEDURE — G8754 DIAS BP LESS 90: HCPCS | Performed by: FAMILY MEDICINE

## 2021-02-03 PROCEDURE — 1101F PT FALLS ASSESS-DOCD LE1/YR: CPT | Performed by: FAMILY MEDICINE

## 2021-02-03 PROCEDURE — G8427 DOCREV CUR MEDS BY ELIG CLIN: HCPCS | Performed by: FAMILY MEDICINE

## 2021-02-03 NOTE — PROGRESS NOTES
1. Have you been to the ER, urgent care clinic since your last visit? Hospitalized since your last visit? No    2. Have you seen or consulted any other health care providers outside of the [address] [place] [address] since your last visit? Include any pap smears or colon screening. No     Chief Complaint   Patient presents with    Complete Physical     HTN. Patient reports last meal 1030am today.  Medication Refill     3 most recent PHQ Screens 2/3/2021   Little interest or pleasure in doing things Not at all   Feeling down, depressed, irritable, or hopeless Not at all   Total Score PHQ 2 0     Visit Vitals  /73 (BP 1 Location: Left upper arm, BP Patient Position: Sitting, BP Cuff Size: Adult)   Pulse 86   Temp 97.2 °F (36.2 °C) (Skin)   Resp 16   Ht 5' 8\" (1.727 m)   Wt 182 lb (82.6 kg)   SpO2 95%   BMI 27.67 kg/m²     Pharmacy verified.    [facility] [address] [place] [address] Dr VENEGAS AT [facility]

## 2021-02-03 NOTE — PROGRESS NOTES
Chief Complaint   Patient presents with    Complete Physical     HTN. Patient reports last meal 1030am today.  Medication Refill     he is a 78y.o. year old male who presents for evalution. Reviewed PmHx, RxHx, FmHx, SocHx, AllgHx and updated and dated in the chart. Aspirin yes ____   No____ N/A____    Patient Active Problem List    Diagnosis    Hyperlipidemia    Prostate cancer ([facility]    Essential hypertension       Nurse notes were reviewed and copied and are correct  Review of Systems - negative except as listed above in the HPI    Objective:     Vitals:    02/03/21 1506   BP: 136/73   Pulse: 86   Resp: 16   Temp: 97.2 °F (36.2 °C)   TempSrc: Skin   SpO2: 95%   Weight: 182 lb (82.6 kg)   Height: 5' 8\" (1.727 m)     Physical Examination: General appearance - alert, well appearing, and in no distress  Mental status - alert, oriented to person, place, and time  Neck - supple, no significant adenopathy  Chest - clear to auscultation, no wheezes, rales or rhonchi, symmetric air entry  Heart - normal rate, regular rhythm, normal S1, S2, no murmurs, rubs, clicks or gallops  Abdomen - soft, nontender, nondistended, no masses or organomegaly  Neurological - alert, oriented, normal speech, no focal findings or movement disorder noted  Musculoskeletal - no joint tenderness, deformity or swelling  Extremities - peripheral pulses normal, no pedal edema, no clubbing or cyanosis         Assessment/ Plan:   Diagnoses and all orders for this visit:    1. Essential hypertension  -     METABOLIC PANEL, COMPREHENSIVE    2. Prostate cancer ([facility]  -     PSA SCREENING (SCREENING)    3. Hyperlipidemia, unspecified hyperlipidemia type  -     LIPID PANEL           ICD-10-CM ICD-9-CM    1. Essential hypertension  E34 486.2 METABOLIC PANEL, COMPREHENSIVE   2. Prostate cancer (HCC)  C61 185 PSA SCREENING (SCREENING)   3.  Hyperlipidemia, unspecified hyperlipidemia type  E78.5 272.4 LIPID PANEL       I have discussed the diagnosis with the patient and the intended plan as seen in the above orders. The patient has received an after-visit summary and questions were answered concerning future plans. There are no Patient Instructions on file for this visit.

## 2021-02-04 LAB
ALBUMIN SERPL-MCNC: 4.2 G/DL (ref 3.5–5)
ALBUMIN/GLOB SERPL: 1.4 {RATIO} (ref 1.1–2.2)
ALP SERPL-CCNC: 70 U/L (ref 45–117)
ALT SERPL-CCNC: 18 U/L (ref 12–78)
ANION GAP SERPL CALC-SCNC: 3 MMOL/L (ref 5–15)
AST SERPL-CCNC: 15 U/L (ref 15–37)
BILIRUB SERPL-MCNC: 0.4 MG/DL (ref 0.2–1)
BUN SERPL-MCNC: 14 MG/DL (ref 6–20)
BUN/CREAT SERPL: 10 (ref 12–20)
CALCIUM SERPL-MCNC: 9.7 MG/DL (ref 8.5–10.1)
CHLORIDE SERPL-SCNC: 109 MMOL/L (ref 97–108)
CHOLEST SERPL-MCNC: 236 MG/DL
CO2 SERPL-SCNC: 27 MMOL/L (ref 21–32)
CREAT SERPL-MCNC: 1.42 MG/DL (ref 0.7–1.3)
GLOBULIN SER CALC-MCNC: 3.1 G/DL (ref 2–4)
GLUCOSE SERPL-MCNC: 104 MG/DL (ref 65–100)
HDLC SERPL-MCNC: 80 MG/DL
HDLC SERPL: 3 {RATIO} (ref 0–5)
LDLC SERPL CALC-MCNC: 145.2 MG/DL (ref 0–100)
LIPID PROFILE,FLP: ABNORMAL
POTASSIUM SERPL-SCNC: 4.6 MMOL/L (ref 3.5–5.1)
PROT SERPL-MCNC: 7.3 G/DL (ref 6.4–8.2)
PSA SERPL-MCNC: 11.8 NG/ML (ref 0.01–4)
SODIUM SERPL-SCNC: 139 MMOL/L (ref 136–145)
TRIGL SERPL-MCNC: 54 MG/DL (ref ?–150)
VLDLC SERPL CALC-MCNC: 10.8 MG/DL

## 2021-02-14 NOTE — PROGRESS NOTES
The cholesterol is a little  higher than goal, avoid fried food and avoid junk food. I want to repeat this in 3 months  The kidney function is slightly abnormal, I want to repeat it in 3 months  The prostate test is still abnormal but is the same as it was a year ago.

## 2021-02-17 ENCOUNTER — TELEPHONE (OUTPATIENT)
Dept: FAMILY MEDICINE CLINIC | Age: 81
End: 2021-02-17

## 2021-02-17 NOTE — TELEPHONE ENCOUNTER
Please advise     Dr TONIE cr to fill not sure what happened on the sent out       ----- Message from Migue Capone sent at 2/17/2021  9:13 AM EST -----  Regarding: Dr. Vainney Zelaya: [phone]  General Message/Vendor Calls    Caller's first and last name: Pt.      Reason for call: Pharmacy has not yet received Rx for \"Enalapril\", requested on 2/14/21. Callback required yes/no and why: Yes, requested for confirmation. Best contact number(s):[phone]. Details to clarify the request: Pt is very upset as he is out of this medication and doesn't understand why this has not already been sent.       Migue Capone

## 2021-02-26 DIAGNOSIS — I10 ESSENTIAL HYPERTENSION: ICD-10-CM

## 2021-02-26 RX ORDER — FELODIPINE 10 MG/1
TABLET, EXTENDED RELEASE ORAL
Qty: 90 TAB | Refills: 1 | Status: SHIPPED | OUTPATIENT
Start: 2021-02-26 | End: 2021-10-11

## 2021-08-24 DIAGNOSIS — I10 ESSENTIAL HYPERTENSION: ICD-10-CM

## 2021-08-29 RX ORDER — ENALAPRIL MALEATE 20 MG/1
TABLET ORAL
Qty: 90 TABLET | Refills: 1 | Status: SHIPPED | OUTPATIENT
Start: 2021-08-29 | End: 2022-03-20

## 2021-10-10 DIAGNOSIS — I10 ESSENTIAL HYPERTENSION: ICD-10-CM

## 2021-10-11 RX ORDER — FELODIPINE 10 MG/1
TABLET, EXTENDED RELEASE ORAL
Qty: 90 TABLET | Refills: 1 | Status: SHIPPED | OUTPATIENT
Start: 2021-10-11 | End: 2022-07-14

## 2022-03-19 PROBLEM — E78.5 HYPERLIPIDEMIA: Status: ACTIVE | Noted: 2017-12-14

## 2022-03-21 DIAGNOSIS — I10 ESSENTIAL HYPERTENSION: ICD-10-CM

## 2022-03-24 RX ORDER — ENALAPRIL MALEATE 20 MG/1
TABLET ORAL
Qty: 30 TABLET | Refills: 0 | Status: SHIPPED | OUTPATIENT
Start: 2022-03-24 | End: 2022-07-21 | Stop reason: SDUPTHER

## 2022-05-06 ENCOUNTER — VIRTUAL VISIT (OUTPATIENT)
Dept: FAMILY MEDICINE CLINIC | Age: 82
End: 2022-05-06

## 2022-05-06 NOTE — PROGRESS NOTES
1. Have you been to the ER, urgent care clinic since your last visit? Hospitalized since your last visit? No    2. Have you seen or consulted any other health care providers outside of the [address] [place] [address] since your last visit? Include any pap smears or colon screening. No     Chief Complaint   Patient presents with    Hypertension    Medication Refill     3 most recent PHQ Screens 5/6/2022   Little interest or pleasure in doing things Not at all   Feeling down, depressed, irritable, or hopeless Not at all   Total Score PHQ 2 0     Abuse Screening Questionnaire 5/6/2022   Do you ever feel afraid of your partner? N   Are you in a relationship with someone who physically or mentally threatens you? N   Is it safe for you to go home?  Y     Patient-Reported Vitals 5/6/2022   Patient-Reported Weight 170lb      Learning Assessment 3/19/2015   PRIMARY LEARNER Patient   HIGHEST LEVEL OF EDUCATION - PRIMARY LEARNER  > 4 YEARS OF COLLEGE   BARRIERS PRIMARY LEARNER NONE   CO-LEARNER CAREGIVER No   PRIMARY LANGUAGE ENGLISH   LEARNER PREFERENCE PRIMARY READING   ANSWERED BY patient   RELATIONSHIP SELF     Health Maintenance Due   Topic Date Due    COVID-19 Vaccine (1) Never done    Shingrix Vaccine Age 50> (1 of 2) Never done    Pneumococcal 65+ years (1 - PCV) Never done    Medicare Yearly Exam  01/18/2020    Depression Screen  02/03/2022

## 2022-07-07 DIAGNOSIS — I10 ESSENTIAL HYPERTENSION: ICD-10-CM

## 2022-07-14 RX ORDER — FELODIPINE 10 MG/1
TABLET, EXTENDED RELEASE ORAL
Qty: 90 TABLET | Refills: 1 | Status: SHIPPED | OUTPATIENT
Start: 2022-07-14 | End: 2022-07-21 | Stop reason: SDUPTHER

## 2022-07-21 ENCOUNTER — OFFICE VISIT (OUTPATIENT)
Dept: FAMILY MEDICINE CLINIC | Age: 82
End: 2022-07-21
Payer: MEDICARE

## 2022-07-21 VITALS
BODY MASS INDEX: 26.16 KG/M2 | HEART RATE: 80 BPM | OXYGEN SATURATION: 100 % | RESPIRATION RATE: 16 BRPM | SYSTOLIC BLOOD PRESSURE: 134 MMHG | HEIGHT: 68 IN | TEMPERATURE: 97.8 F | DIASTOLIC BLOOD PRESSURE: 78 MMHG | WEIGHT: 172.6 LBS

## 2022-07-21 DIAGNOSIS — I10 ESSENTIAL HYPERTENSION: Primary | ICD-10-CM

## 2022-07-21 DIAGNOSIS — C61 PROSTATE CANCER (HCC): ICD-10-CM

## 2022-07-21 DIAGNOSIS — E78.5 HYPERLIPIDEMIA, UNSPECIFIED HYPERLIPIDEMIA TYPE: ICD-10-CM

## 2022-07-21 PROCEDURE — G8427 DOCREV CUR MEDS BY ELIG CLIN: HCPCS | Performed by: FAMILY MEDICINE

## 2022-07-21 PROCEDURE — G8754 DIAS BP LESS 90: HCPCS | Performed by: FAMILY MEDICINE

## 2022-07-21 PROCEDURE — G8417 CALC BMI ABV UP PARAM F/U: HCPCS | Performed by: FAMILY MEDICINE

## 2022-07-21 PROCEDURE — 1123F ACP DISCUSS/DSCN MKR DOCD: CPT | Performed by: FAMILY MEDICINE

## 2022-07-21 PROCEDURE — G8510 SCR DEP NEG, NO PLAN REQD: HCPCS | Performed by: FAMILY MEDICINE

## 2022-07-21 PROCEDURE — 1101F PT FALLS ASSESS-DOCD LE1/YR: CPT | Performed by: FAMILY MEDICINE

## 2022-07-21 PROCEDURE — G8752 SYS BP LESS 140: HCPCS | Performed by: FAMILY MEDICINE

## 2022-07-21 PROCEDURE — 99214 OFFICE O/P EST MOD 30 MIN: CPT | Performed by: FAMILY MEDICINE

## 2022-07-21 PROCEDURE — G8536 NO DOC ELDER MAL SCRN: HCPCS | Performed by: FAMILY MEDICINE

## 2022-07-21 RX ORDER — ENALAPRIL MALEATE 20 MG/1
20 TABLET ORAL DAILY
Qty: 90 TABLET | Refills: 1 | Status: SHIPPED | OUTPATIENT
Start: 2022-07-21

## 2022-07-21 RX ORDER — DOXAZOSIN 8 MG/1
8 TABLET ORAL DAILY
Qty: 90 TABLET | Refills: 3 | Status: SHIPPED | OUTPATIENT
Start: 2022-07-21

## 2022-07-21 RX ORDER — ATORVASTATIN CALCIUM 10 MG/1
10 TABLET, FILM COATED ORAL DAILY
Qty: 30 TABLET | Refills: 2 | Status: SHIPPED | OUTPATIENT
Start: 2022-07-21 | End: 2022-10-25

## 2022-07-21 RX ORDER — FELODIPINE 10 MG/1
10 TABLET, EXTENDED RELEASE ORAL DAILY
Qty: 90 TABLET | Refills: 1 | Status: SHIPPED | OUTPATIENT
Start: 2022-07-21

## 2022-07-21 NOTE — PROGRESS NOTES
Chief Complaint   Patient presents with    Medication Refill     he is a 78y.o. year old male who presents for evalution. He needs refills on bp meds  Bp under great control  No new complaints        Reviewed PmHx, RxHx, FmHx, SocHx, AllgHx and updated and dated in the chart. Aspirin yes ____   No____ N/A____    Patient Active Problem List    Diagnosis    Hyperlipidemia    Prostate cancer [facility])    Essential hypertension       Nurse notes were reviewed and copied and are correct  Review of Systems - negative except as listed above in the HPI    Objective:     Vitals:    07/21/22 1425   BP: 134/78   Pulse: 80   Resp: 16   Temp: 97.8 °F (36.6 °C)   TempSrc: Temporal   SpO2: 100%   Weight: 172 lb 9.6 oz (78.3 kg)   Height: 5' 8\" (1.727 m)     Physical Examination: General appearance - alert, well appearing, and in no distress  Mental status - alert, oriented to person, place, and time  Neck - right side bruit old  Chest - clear to auscultation, no wheezes, rales or rhonchi, symmetric air entry  Heart - normal rate, regular rhythm, normal S1, S2, no murmurs, rubs, clicks or gallops  Abdomen - soft, nontender, nondistended, no masses or organomegaly  Musculoskeletal - no joint tenderness, deformity or swelling         Assessment/ Plan:   Diagnoses and all orders for this visit:    1. Essential hypertension  -     METABOLIC PANEL, COMPREHENSIVE; Future  -     felodipine (PLENDIL SR) 10 mg 24 hr tablet; Take 1 Tablet by mouth in the morning.  -     enalapril (VASOTEC) 20 mg tablet; Take 1 Tablet by mouth in the morning.  -     doxazosin (CARDURA) 8 mg tablet; Take 1 Tablet by mouth in the morning. 2. Prostate cancer ([facility]  Followed by urology  3. Hyperlipidemia, unspecified hyperlipidemia type  -     LIPID PANEL; Future  -     atorvastatin (LIPITOR) 10 mg tablet; Take 1 Tablet by mouth in the morning. Start statin  Stop if develops muscle weakness or pain      ICD-10-CM ICD-9-CM    1.  Essential hypertension  I10 866.3 METABOLIC PANEL, COMPREHENSIVE      felodipine (PLENDIL SR) 10 mg 24 hr tablet      enalapril (VASOTEC) 20 mg tablet      doxazosin (CARDURA) 8 mg tablet      2. Prostate cancer ([facility]  C61 185       3. Hyperlipidemia, unspecified hyperlipidemia type  E78.5 272.4 LIPID PANEL      atorvastatin (LIPITOR) 10 mg tablet          I have discussed the diagnosis with the patient and the intended plan as seen in the above orders. The patient has received an after-visit summary  if not on [facility] and questions were answered concerning future plans. Patients in [facility] have access to avs without printing    Medication Side Effects and Warnings were discussed with patient:   Patient Labs were reviewed and or requested:   Patient Past Records were reviewed and or requested: yes        There are no Patient Instructions on file for this visit.

## 2022-07-21 NOTE — PROGRESS NOTES
Chief Complaint   Patient presents with    Medication Refill     1. Have you been to the ER, urgent care clinic since your last visit? Hospitalized since your last visit? No    2. Have you seen or consulted any other health care providers outside of the [address] [place] [address] since your last visit? Include any pap smears or colon screening.  No

## 2022-07-22 LAB
ALBUMIN SERPL-MCNC: 3.8 G/DL (ref 3.5–5)
ALBUMIN/GLOB SERPL: 1.2 {RATIO} (ref 1.1–2.2)
ALP SERPL-CCNC: 64 U/L (ref 45–117)
ALT SERPL-CCNC: 15 U/L (ref 12–78)
ANION GAP SERPL CALC-SCNC: 6 MMOL/L (ref 5–15)
AST SERPL-CCNC: 12 U/L (ref 15–37)
BILIRUB SERPL-MCNC: 0.3 MG/DL (ref 0.2–1)
BUN SERPL-MCNC: 14 MG/DL (ref 6–20)
BUN/CREAT SERPL: 10 (ref 12–20)
CALCIUM SERPL-MCNC: 10 MG/DL (ref 8.5–10.1)
CHLORIDE SERPL-SCNC: 109 MMOL/L (ref 97–108)
CHOLEST SERPL-MCNC: 223 MG/DL
CO2 SERPL-SCNC: 30 MMOL/L (ref 21–32)
CREAT SERPL-MCNC: 1.37 MG/DL (ref 0.7–1.3)
GLOBULIN SER CALC-MCNC: 3.2 G/DL (ref 2–4)
GLUCOSE SERPL-MCNC: 107 MG/DL (ref 65–100)
HDLC SERPL-MCNC: 65 MG/DL
HDLC SERPL: 3.4 {RATIO} (ref 0–5)
LDLC SERPL CALC-MCNC: 146.8 MG/DL (ref 0–100)
POTASSIUM SERPL-SCNC: 3.8 MMOL/L (ref 3.5–5.1)
PROT SERPL-MCNC: 7 G/DL (ref 6.4–8.2)
SODIUM SERPL-SCNC: 145 MMOL/L (ref 136–145)
TRIGL SERPL-MCNC: 56 MG/DL (ref ?–150)
VLDLC SERPL CALC-MCNC: 11.2 MG/DL

## 2022-07-30 NOTE — PROGRESS NOTES
The cholesterol level is still a little high but I think we are being aggressive enough for your age.  Continue the lipitor and avoid fried food and junk foods  The kidney test is better but  still a little abnormal   The liver test is normal

## 2023-05-24 RX ORDER — ATORVASTATIN CALCIUM 10 MG/1
10 TABLET, FILM COATED ORAL DAILY
COMMUNITY
Start: 2022-10-25

## 2023-05-24 RX ORDER — CHLORHEXIDINE GLUCONATE 0.12 MG/ML
RINSE ORAL
COMMUNITY
Start: 2018-04-20

## 2023-05-24 RX ORDER — DOXAZOSIN 8 MG/1
8 TABLET ORAL DAILY
COMMUNITY
Start: 2022-07-21

## 2023-05-24 RX ORDER — BIMATOPROST 0.1 MG/ML
SOLUTION/ DROPS OPHTHALMIC
COMMUNITY
Start: 2017-03-30

## 2023-05-24 RX ORDER — FLUTICASONE PROPIONATE 50 MCG
SPRAY, SUSPENSION (ML) NASAL
COMMUNITY

## 2023-05-24 RX ORDER — ENALAPRIL MALEATE 20 MG/1
20 TABLET ORAL DAILY
COMMUNITY
Start: 2022-07-21

## 2023-05-24 RX ORDER — GUAIFENESIN 1200 MG/1
1200 TABLET, EXTENDED RELEASE ORAL 2 TIMES DAILY
COMMUNITY
Start: 2019-01-05

## 2023-05-24 RX ORDER — FELODIPINE 10 MG/1
10 TABLET, EXTENDED RELEASE ORAL DAILY
COMMUNITY
Start: 2022-07-21

## 2023-08-11 RX ORDER — DOXAZOSIN 8 MG/1
TABLET ORAL
Qty: 30 TABLET | Refills: 0 | Status: SHIPPED | OUTPATIENT
Start: 2023-08-11 | End: 2023-09-18 | Stop reason: SDUPTHER

## 2023-08-11 RX ORDER — DOXAZOSIN 8 MG/1
TABLET ORAL
Qty: 90 TABLET | OUTPATIENT
Start: 2023-08-11

## 2023-08-11 NOTE — TELEPHONE ENCOUNTER
PCP: Ramin Ramsey MD    Last appt: [unfilled]  Future Appointments   Date Time Provider [address]   9/18/2023  2:20 PM Emily Guerin MD [facility]   Medication filled on 7/21/2022 by Kenji Mirza MD    Requested Prescriptions     Pending Prescriptions Disp Refills    doxazosin (CARDURA) 8 MG tablet [Pharmacy Med Name: DOXAZOSIN 8MG TABLETS] 90 tablet      Sig: TAKE 1 TABLET BY MOUTH IN THE MORNING       Prior labs and Blood pressures:  BP Readings from Last 3 Encounters:   07/21/22 134/78     Lab Results   Component Value Date/Time     07/21/2022 03:23 PM    K 3.8 07/21/2022 03:23 PM     07/21/2022 03:23 PM    CO2 30 07/21/2022 03:23 PM    BUN 14 07/21/2022 03:23 PM    GFRAA >60 07/21/2022 03:23 PM     No results found for: HBA1C, [id number]  Lab Results   Component Value Date/Time    CHOL 223 07/21/2022 03:23 PM    HDL 65 07/21/2022 03:23 PM     No results found for: VITD3, VD3RIA    No results found for: TSH, TSH2, TSH3

## 2023-08-21 RX ORDER — FELODIPINE 10 MG/1
10 TABLET, EXTENDED RELEASE ORAL DAILY
Qty: 30 TABLET | Refills: 0 | Status: SHIPPED | OUTPATIENT
Start: 2023-08-21 | End: 2023-09-18 | Stop reason: SDUPTHER

## 2023-08-21 RX ORDER — ENALAPRIL MALEATE 20 MG/1
TABLET ORAL
Qty: 30 TABLET | Refills: 0 | Status: SHIPPED | OUTPATIENT
Start: 2023-08-21 | End: 2023-09-18 | Stop reason: SDUPTHER

## 2023-08-21 NOTE — TELEPHONE ENCOUNTER
PCP: Navya Aparicio MD    Last appt: [unfilled]  Last refilled by Navya Aparicio MD on 10/25/2022  Future Appointments   Date Time Provider [address]   9/18/2023  2:20 PM William Albrecht MD [facility]       Requested Prescriptions     Pending Prescriptions Disp Refills    felodipine (PLENDIL) 10 MG extended release tablet [Pharmacy Med Name: FELODIPINE 10MG ER TABLETS] 90 tablet      Sig: TAKE 1 TABLET BY MOUTH DAILY    enalapril (VASOTEC) 20 MG tablet [Pharmacy Med Name: ENALAPRIL 20MG TABLETS] 90 tablet      Sig: TAKE 1 TABLET BY MOUTH IN THE MORNING       Prior labs and Blood pressures:  BP Readings from Last 3 Encounters:   07/21/22 134/78     Lab Results   Component Value Date/Time     07/21/2022 03:23 PM    K 3.8 07/21/2022 03:23 PM     07/21/2022 03:23 PM    CO2 30 07/21/2022 03:23 PM    BUN 14 07/21/2022 03:23 PM    GFRAA >60 07/21/2022 03:23 PM     No results found for: HBA1C, [id number]  Lab Results   Component Value Date/Time    CHOL 223 07/21/2022 03:23 PM    HDL 65 07/21/2022 03:23 PM     No results found for: VITD3, VD3RIA    No results found for: TSH, TSH2, TSH3

## 2023-08-25 RX ORDER — ENALAPRIL MALEATE 20 MG/1
TABLET ORAL
Qty: 90 TABLET | OUTPATIENT
Start: 2023-08-25

## 2023-08-25 RX ORDER — FELODIPINE 10 MG/1
10 TABLET, EXTENDED RELEASE ORAL DAILY
Qty: 90 TABLET | OUTPATIENT
Start: 2023-08-25

## 2023-08-25 NOTE — TELEPHONE ENCOUNTER
PCP: Taylor Stewart MD    Last appt: [unfilled]  Duplicate request. This was approved by you 4 days ago. We can disregard.        Future Appointments   Date Time Provider [address]  [address]   9/18/2023  2:20 PM Brissa Ayon MD [facility]       Requested Prescriptions     Pending Prescriptions Disp Refills    enalapril (VASOTEC) 20 MG tablet [Pharmacy Med Name: ENALAPRIL 20MG TABLETS] 90 tablet      Sig: TAKE 1 TABLET BY MOUTH IN THE MORNING       Prior labs and Blood pressures:  BP Readings from Last 3 Encounters:   07/21/22 134/78     Lab Results   Component Value Date/Time     07/21/2022 03:23 PM    K 3.8 07/21/2022 03:23 PM     07/21/2022 03:23 PM    CO2 30 07/21/2022 03:23 PM    BUN 14 07/21/2022 03:23 PM    GFRAA >60 07/21/2022 03:23 PM     No results found for: HBA1C, [id number]  Lab Results   Component Value Date/Time    CHOL 223 07/21/2022 03:23 PM    HDL 65 07/21/2022 03:23 PM     No results found for: VITD3, VD3RIA    No results found for: TSH, TSH2, TSH3

## 2023-08-25 NOTE — TELEPHONE ENCOUNTER
PCP: aRmin Ramsey MD    Last appt: [unfilled]  Duplicate request. This was approved by you 4 days ago. We can disregard.        Future Appointments   Date Time Provider [address]  [address]   9/18/2023  2:20 PM Emily Guerin MD [facility]       Requested Prescriptions     Pending Prescriptions Disp Refills    felodipine (PLENDIL) 10 MG extended release tablet [Pharmacy Med Name: FELODIPINE 10MG ER TABLETS] 90 tablet      Sig: TAKE 1 TABLET BY MOUTH DAILY       Prior labs and Blood pressures:  BP Readings from Last 3 Encounters:   07/21/22 134/78     Lab Results   Component Value Date/Time     07/21/2022 03:23 PM    K 3.8 07/21/2022 03:23 PM     07/21/2022 03:23 PM    CO2 30 07/21/2022 03:23 PM    BUN 14 07/21/2022 03:23 PM    GFRAA >60 07/21/2022 03:23 PM     No results found for: HBA1C, [id number]  Lab Results   Component Value Date/Time    CHOL 223 07/21/2022 03:23 PM    HDL 65 07/21/2022 03:23 PM     No results found for: VITD3, VD3RIA    No results found for: TSH, TSH2, TSH3

## 2023-09-18 ENCOUNTER — OFFICE VISIT (OUTPATIENT)
Facility: CLINIC | Age: 83
End: 2023-09-18
Payer: COMMERCIAL

## 2023-09-18 VITALS
RESPIRATION RATE: 16 BRPM | HEART RATE: 64 BPM | WEIGHT: 163 LBS | TEMPERATURE: 96.9 F | SYSTOLIC BLOOD PRESSURE: 172 MMHG | HEIGHT: 68 IN | OXYGEN SATURATION: 100 % | DIASTOLIC BLOOD PRESSURE: 73 MMHG | BODY MASS INDEX: 24.71 KG/M2

## 2023-09-18 DIAGNOSIS — Z28.21 IMMUNIZATION REFUSED: ICD-10-CM

## 2023-09-18 DIAGNOSIS — R97.20 ABNORMAL PROSTATE SPECIFIC ANTIGEN (PSA): ICD-10-CM

## 2023-09-18 DIAGNOSIS — I10 ESSENTIAL HYPERTENSION: Primary | ICD-10-CM

## 2023-09-18 DIAGNOSIS — E78.5 HYPERLIPIDEMIA, UNSPECIFIED HYPERLIPIDEMIA TYPE: ICD-10-CM

## 2023-09-18 PROBLEM — H40.053: Status: ACTIVE | Noted: 2023-09-18

## 2023-09-18 PROCEDURE — 3078F DIAST BP <80 MM HG: CPT | Performed by: STUDENT IN AN ORGANIZED HEALTH CARE EDUCATION/TRAINING PROGRAM

## 2023-09-18 PROCEDURE — 99214 OFFICE O/P EST MOD 30 MIN: CPT | Performed by: STUDENT IN AN ORGANIZED HEALTH CARE EDUCATION/TRAINING PROGRAM

## 2023-09-18 PROCEDURE — 1123F ACP DISCUSS/DSCN MKR DOCD: CPT | Performed by: STUDENT IN AN ORGANIZED HEALTH CARE EDUCATION/TRAINING PROGRAM

## 2023-09-18 PROCEDURE — 3077F SYST BP >= 140 MM HG: CPT | Performed by: STUDENT IN AN ORGANIZED HEALTH CARE EDUCATION/TRAINING PROGRAM

## 2023-09-18 RX ORDER — FELODIPINE 10 MG/1
10 TABLET, EXTENDED RELEASE ORAL DAILY
Qty: 90 TABLET | Refills: 0 | Status: SHIPPED | OUTPATIENT
Start: 2023-09-18

## 2023-09-18 RX ORDER — LATANOPROST 50 UG/ML
SOLUTION/ DROPS OPHTHALMIC
COMMUNITY
Start: 2023-09-09

## 2023-09-18 RX ORDER — ENALAPRIL MALEATE 20 MG/1
20 TABLET ORAL EVERY MORNING
Qty: 90 TABLET | Refills: 0 | Status: SHIPPED | OUTPATIENT
Start: 2023-09-18

## 2023-09-18 RX ORDER — DOXAZOSIN 8 MG/1
8 TABLET ORAL EVERY MORNING
Qty: 90 TABLET | Refills: 0 | Status: SHIPPED | OUTPATIENT
Start: 2023-09-18

## 2023-09-18 ASSESSMENT — PATIENT HEALTH QUESTIONNAIRE - PHQ9
SUM OF ALL RESPONSES TO PHQ QUESTIONS 1-9: 0
SUM OF ALL RESPONSES TO PHQ QUESTIONS 1-9: 0
2. FEELING DOWN, DEPRESSED OR HOPELESS: 0
SUM OF ALL RESPONSES TO PHQ QUESTIONS 1-9: 0
SUM OF ALL RESPONSES TO PHQ9 QUESTIONS 1 & 2: 0
SUM OF ALL RESPONSES TO PHQ QUESTIONS 1-9: 0
1. LITTLE INTEREST OR PLEASURE IN DOING THINGS: 0

## 2023-09-18 ASSESSMENT — ENCOUNTER SYMPTOMS
RHINORRHEA: 0
NAUSEA: 0
COUGH: 0
SHORTNESS OF BREATH: 0
VOMITING: 0
ABDOMINAL PAIN: 0

## 2023-09-18 NOTE — PROGRESS NOTES
Assessment/Plan:     Diagnoses and all orders for this visit:    Essential hypertension  -     felodipine (PLENDIL) 10 MG extended release tablet; Take 1 tablet by mouth daily  -     enalapril (VASOTEC) 20 MG tablet; Take 1 tablet by mouth every morning  -     doxazosin (CARDURA) 8 MG tablet; Take 1 tablet by mouth every morning  -     CBC; Future  -     Hepatic Function Panel; Future  -     Basic Metabolic Panel; Future  -Chronic. Elevated in office today although home blood pressure readings show good control  -Patient does endorse dizziness if BP drops into the 848X systolic  -Given home blood pressures have been well controlled will continue on current regimen and advised him to keep BP log  -Follow-up in several weeks for repeat BP check as well as to bring in his home blood pressure cuff to check accuracy  -If BP remains elevated consider increasing enalapril  -Check routine lab work today    Hyperlipidemia, unspecified hyperlipidemia type  -     Hepatic Function Panel; Future  -     Basic Metabolic Panel; Future  -     Lipid Panel; Future  -History of hyperlipidemia  -Repeat lipid panel today    Abnormal prostate specific antigen (PSA)  -     PSA, Diagnostic; Future  -Patient has prior history of prostate cancer listed on problem list although he states he does not have a history of prostate cancer  -Has been evaluated by urology in the past.  Requesting records for review  -Repeat PSA level  -If persistent elevation recommend reevaluation by urology    Immunization refused  -Patient declines all immunizations at this time         Return in about 2 weeks (around 10/2/2023), or if symptoms worsen or fail to improve. Discussed expected course/resolution/complications of diagnosis in detail with patient. Medication risks/benefits/costs/interactions/alternatives discussed with patient.     Pt expressed understanding with the diagnosis and plan      Subjective:      Shine Reynolds is a 78 y.o. male who
Identified pt with two pt identifiers(name and ). Reviewed record in preparation for visit and have obtained necessary documentation. Chief Complaint   Patient presents with    Blood Pressure Check        Health Maintenance Due   Topic    COVID-19 Vaccine (1)    DTaP/Tdap/Td vaccine (1 - Tdap)    Shingles vaccine (1 of 2)    Pneumococcal 65+ years Vaccine (1 - PCV)    Prostate Specific Antigen (PSA) Screening or Monitoring     Annual Wellness Visit (AWV)     Lipids     Depression Screen     Flu vaccine (1)       Coordination of Care Questionnaire:  :   1) Have you been to an emergency room, urgent care, or hospitalized since your last visit? If yes, where when, and reason for visit? no      2. Have seen or consulted any other health care provider since your last visit? If yes, where when, and reason for visit?  no        Patient is accompanied by self I have received verbal consent from Bunny Gilliland to discuss any/all medical information while they are present in the room.
COVID-19 (Enfermedad por coronavirus 2019)    LO QUE NECESITA SABER:    COVID-19 es la enfermedad causada por el nuevo coronavirus descubierto por primera vez en diciembre de 2019. Los coronavirus generalmente causan infecciones de las vías respiratorias superiores (nariz, garganta y pulmones), jony un resfriado. El nuevo virus también puede causar afecciones respiratorias inferiores graves, jony la neumonía o el síndrome de dificultad respiratoria aguda (SDRA). El nuevo virus también puede afectar muchos otros órganos, incluyendo el cerebro y el corazón. Los vasos sanguíneos también pueden verse afectados, lo que provoca la formación de coágulos de sonal. Cualquier persona puede desarrollar problemas graves a causa del nuevo virus, sherrie el riesgo es mayor si tiene 65 años o más. Un sistema inmunitario débil, la diabetes o sammy enfermedad cardíaca o pulmonar también pueden aumentar el riesgo.    INSTRUCCIONES SOBRE EL AILYN HOSPITALARIA:    Si mendy que usted o alguien que conoce puede estar infectado:Navya lo siguiente para proteger a otras personas:   •Si se requiere atención de emergencia,avise al operador de la posible infección, o llame antes y avise al servicio de urgencias.      •Llame a un médicopara recibir instrucciones si los síntomas son leves. Cualquier persona que pueda estar infectada no debe llegar sin llamar viji. El médico deberá proteger a los miembros del personal y a otros pacientes.      •La persona que puede estar infectada debe usar un tapabocasmientras reciben atención médica. [place] ayudará a reducir el riesgo de infectar a otras personas. Nadie que sea narendra de 2 años, que tenga problemas respiratorios o que no pueda quitárselo debe usar un tapabocas. El médico puede darle instrucciones para cualquier persona que no pueda usar un tapabocas.      Llame al número local de emergencias (911 en los [place]) o pídale a alguien que lo lleve al departamento de emergencias si:  •Usted tiene dificultad para respirar o falta de aliento mientras descansa.      •Usted siente presión o dolor en el pecho que dura más de 5 minutos.      •Usted tiene confusión o es difícil despertarlo.      •Cinthia labios o paul están azules.      •Usted tiene fiebre de 104 ºF (40 °C) o más.      Llame a benitez médico si:  •No tiene síntomas de COVID-19 sherrie tuvo contacto físico cercano dentro de los 14 días con alguien que enmanuel positivo.      •Usted tiene preguntas o inquietudes acerca de benitez condición o cuidado.      Medicamentos:Es posible que necesite alguno de los siguientes si los síntomas son leves:   •Los descongestionantesayudan a reducir la congestión nasal y ayudan a respirar más fácilmente. Si cady pastillas descongestionantes, pueden causarle agitación o problemas para dormir. No use aerosol descongestionante por más de unos cuantos días.      •Los jarabes para la tosayudan a reducir la tos. Pregúntele a benitez médico cuál tipo de medicamento para la tos es mejor para usted.      •Acetaminofénalivia el dolor y baja la fiebre. Está disponible sin receta médica. Pregunte la cantidad y la frecuencia con que debe tomarlos. Siga las indicaciones. Aidan las etiquetas de todos los demás medicamentos que esté usando para saber si también contienen acetaminofén, o pregunte a benitez médico o farmacéutico. El acetaminofén puede causar daño en el hígado cuando no se cady de forma correcta. No use más de 4 gramos (4000 miligramos) en total de acetaminofeno en un día.      •Los STEVE,jony el ibuprofeno, ayudan a disminuir la inflamación, el dolor y la fiebre. Los STEVE pueden causar sangrado estomacal o problemas renales en ciertas personas. Si usted cady un medicamento anticoagulante, siempre pregúntele a benitez médico si los STEVE son seguros para usted. Siempre aidan la etiqueta de erica medicamento y siga las instrucciones.      •Nellie cinthia medicamentos jony se le haya indicado.Consulte con benitez médico si usted mendy que benitez medicamento no le está ayudando o si presenta efectos secundarios. Infórmele si es alérgico a cualquier medicamento. Mantenga sammy lista actualizada de los medicamentos, las vitaminas y los productos herbales que cady. Incluya los siguientes datos de los medicamentos: cantidad, frecuencia y motivo de administración. Traiga con usted la lista o los envases de las píldoras a cinthia citas de seguimiento. Lleve la lista de los medicamentos con usted en radha de sammy emergencia.      Cómo se propaga el coronavirus 2019:El virus se propaga rápida y fácilmente. Puede infectarse si está en contacto con sammy gran cantidad del virus, incluso lauren poco tiempo. También puede infectarse por estar cerca de sammy pequeña cantidad del virus lauren mucho tiempo. A continuación se indican las formas en que se mendy que se propaga el virus, sherrie es posible que surja más información:   •Las gotitas son la forma más común de propagación de todos los coronavirus.El virus puede viajar en gotitas que se nuha cuando sammy persona habla, tose o estornuda. Cualquiera que respire las gotitas o que las gotitas se le metan en los ojos puede infectarse con el virus. Las gotitas pueden permanecer en el aire lauren unas horas y viajar a más de 6 pies (2 metros). Sammy persona puede tener contacto con las gotitas, incluso después de que la persona infectada salga de la habitación. [place] se llama transmisión aérea, sammy forma menos común en la que el virus puede propagarse. Ha sucedido principalmente en habitaciones cerradas que no tienen flujo de aire.      •El contacto personal cercano con sammy persona infectada aumenta el riesgo de infección.El contacto personal cercano significa estar a menos de 6 pies (2 metros) de otra persona. El virus puede transmitirse desde 2 días antes de que comiencen los síntomas. El virus puede transmitirse incluso si la persona nunca desarrolla síntomas, desde 2 días antes de tener sammy prueba positiva. La infección puede ocurrir por un contacto cercano lauren un total de 15 minutos o más en 24 horas. Por ejemplo, el contacto cercano 3 veces lauren 5 minutos cada vez en 24 horas. Algunos factores hacen que sea más fácil infectarse. Estos incluyen la cercanía y la duración del contacto. Si está en un lugar cerrado o al aire marcial también importa. El riesgo es aún mayor si ambas personas no usan tapabocas.      •El contacto de persona a persona puede propagar el virus.Por ejemplo, sammy persona con el virus en cinthia elton puede propagarlo al darle la mano a alguien. En erica momento, no parece que el virus pueda transmitirse a un bebé lauren el embarazo o el parto. Algunos recién nacidos barrientos dado positivo para el virus. Los recién nacidos pueden haberse infectado antes, lauren o después del nacimiento. El mayor riesgo es que un recién nacido esté en contacto cercano con sammy persona infectada. El virus tampoco parece propagarse por la leche materna. Si está embarazada o amamantando, hable con benitez médico u obstetra sobre cualquier preocupación que tenga.      •El virus puede permanecer en objetos y superficies.Sammy persona puede contraer el virus en cinthia elton al tocar el objeto o la superficie. La infección se produce si la persona se toca los ojos o la boca sin antes lavarse las elton. Aún no se sabe cuánto tiempo puede permanecer el virus en un objeto o superficie. Por eso es importante limpiar todas las superficies que se usan regularmente.      •Un animal infectado puede ser capaz de infectar a sammy persona que lo toque.[place] puede ocurrir en mercados vivos o en sammy jennifer.      Cómo todo el tray puede reducir el riesgo de COVID-19:La mejor manera de prevenir la infección es evitar a cualquiera que esté infectado, sherrie esto puede ser difícil de lograr. Sammy persona infectada puede propagar el virus antes de que aparezcan los signos o síntomas, o incluso si los signos o síntomas nunca se desarrollan. Lo siguiente puede ayudar a reducir el riesgo de infección:     Limite la propagación de las enfermedades infecciosas     •Lávese las elton con frecuencia lauren el día.Utilice agua y jabón. Frótese las elton enjabonadas, entrelazando los dedos. Lávese el frente y el dorso de cada mano, y entre los dedos. Use los dedos de sammy mano para restregar debajo de las uñas de la otra mano. Lávese lauren al menos 20 segundos. Enjuague con agua corriente caliente lauren varios segundos. Luego séquese las elton con sammy toalla limpia o sammy toalla de papel. Puede usar un desinfectante para elton que contenga alcohol, si no hay agua y jabón disponibles. No se toque los ojos, la nariz o la boca sin antes lavarse las elton. Enseñe a los niños a lavarse las elton y a usar el desinfectante de elton.  Lavado de elton           •Cúbrase al toser o estornudar.[place] raghav que las gotitas viajen de usted a los demás. Gire la paul y cúbrase la boca y la nariz con un pañuelo. Deseche el pañuelo. Use el ángulo del brazo si no tiene un pañuelo disponible. Luego lávese las elton con agua y jabón o use un desinfectante de elton. Gire la renetta y cúbrase si está cerca de alguien que está estornudando o tosiendo. Enséñeles a los niños a cubrirse al toser o estornudar.      •Siga las pautas de distanciamiento social a nivel local, nacional y mundial.El distanciamiento social significa que las personas evitan el contacto físico cercano para que el virus no se propague de sammy persona a otra. Mantenga al menos 6 pies (2 metros) de distancia entre usted y los demás. También mantenga esta distancia de cualquiera que venga a benitez casa, jony alguien que navya sammy entrega.      •Acostúmbrese a no tocarse la paul.No se sabe cuánto tiempo puede permanecer el virus en los objetos y las superficies. Si tiene el virus en las elton, puede transferirlo a los ojos, la nariz o la boca e infectarse. También puede transferirlo a los objetos, las superficies o las personas. Tenga cuidado con lo que toca cuando sale. Por ejemplo, los pasamanos y botones de ascensor. Intente no tocar nada con las elton descubiertas a menos que sea necesario. Lávese las elton antes de salir de benitez casa y cuando regresa.      •Limpie y desinfecte a menudo los objetos y las superficies de alto contacto.Use sammy solución o toallitas desinfectantes. Puede hacer sammy solución diluyendo 4 cucharaditas de lejía en 1 cuarto de galón (4 tazas) de agua. Limpie y desinfecte aunque piense que nadie que viva o haya entrado en benitez casa esté infectado con el virus. Puede limpiar los objetos con un paño desinfectante antes de llevarlos a benitez casa. Lávese las elton después de manipular cualquier cosa que traiga a benitez casa.      •Navya que benitez sistema inmunitario esté lo más saludable posible.Un sistema inmunitario debilitado lo hace más vulnerable al nuevo coronavirus. No hay ninguna vacuna contra la COVID-19 disponible todavía. Las vacunas, jony las vacunas contra la gripe y la neumonía, pueden ayudar al sistema inmunitario a combatir las infecciones. Benitez médico le indicará qué vacunas debe recibir y cuándo aplicárselas. Mantenga benitez sistema inmunitario lo más maida posible. No fume. Consuma alimentos saludables, navya ejercicio regularmente e intente controlar el estrés. Acuéstese y levántese a la misma hora todos los días.   Alimentos saludables           Pautas de distanciamiento social:Las normas de distanciamiento social nacionales y locales varían. Las reglas pueden cambiar con el tiempo a medida que se levantan las restricciones. Las restricciones pueden volver a aplicarse si se produce un brote en el lugar donde usted vive. Es importante conocer y seguir todas las reglas de distanciamiento social actuales en benitez área. Las siguientes son reglas generales al respecto:  •Limite los viajes fuera de benitez casa.Es posible que se le entreguen alimentos, medicinas y otros suministros. Si es posible, navya que dejen los objetos que le entregan en benitez windy o en otra área. Intente que nadie le entregue un objeto en mano. Estará tan cerca de la persona que el virus puede propagarse entre ustedes.      •No tenga contacto físico cercano con nadie que no viva en benitez casa.No le dé la mano, abrace o bese a sammy persona jony saludo. Párese o camine lo más lejos posible de los demás. Si tiene que usar el transporte público (jony el autobús o el metro), intente sentarse o pararse lejos de los demás. Puede mantenerse conectado de forma espino con los demás a través de llamadas telefónicas, mensajes de correo electrónico, sitios web de medios sociales y videochats. Verifique cómo están las personas que pueden tener dificultades para distanciarse socialmente, o que viven solas. Pregunte a los administradores de los asilos de ancianos o de las instalaciones de cuidados a tylor plazo cómo puede comunicarse con seguridad con alguien que vive allí.      •Use un tapabocas de mony cuando esté cerca de otras personas que no viven en benitez casa.Los tapabocas ayudan evitar que el virus se propague a otras personas en las gotitas. Puede usar un tapabocas transparente si alguien necesita leer cinthia labios. Erica es un tapabocas con un plástico sobre el área de la boca para que se puedan fly los labios. No utilice tapabocas que tengan válvulas de respiración o respiraderos. El virus puede salir por la válvula o el respiradero y contagiar a otros. No se quite el tapabocas para hablar, toser o estornudar. No utilice tapabocas en niños menores de 2 años ni en personas que tengan problemas respiratorios o no puedan quitárselo.      •Permita que solo los profesionales médicos u otros profesionales ingresen a benitez casa.Use el tapabocas y recuérdeles a los profesionales que usen un tapabocas. Recuérdeles que se laven las elton cuando lleguen y antes de irse. No deje entrar a nadie que no viva en benitez casa, aunque no esté enfermo. Sammy persona puede contagiar el virus a otros antes de que comiencen los síntomas de COVID-19. Algunas personas ni siquiera desarrollan síntomas. Los niños suelen tener síntomas leves o ningún síntoma. Puede ser difícil decirle a un pavithra que no lo abrace ni lo bese. Explíquele que así es jony puede ayudarlo a mantenerse saludable.      •No vaya a la casa de otra persona, a menos que sea necesario.No vaya de visita, aunque la persona esté andre. Vaya solo si necesita ayudarla. Asegúrese de que ambos usen un tapabocas mientras esté allí.      •Evite las grandes reuniones y las multitudes.Las reuniones o multitudes de 10 o más individuos pueden hacer que el virus se propague. Por ejemplo, las reuniones incluyen fiestas, eventos deportivos, servicios religiosos y conferencias. Se pueden formar multitudes en las playas, los parques y las atracciones turísticas. Protéjase manteniéndose alejado de las grandes reuniones y multitudes.      •Pregunte a benitez médico de qué otra forma puede tener las citas.Es posible que pueda tener citas sin tener que ir al consultorio del médico. Algunos médicos ofrecen citas por teléfono, video u otros tipos de citas. También puede obtener recetas de cinthia medicamentos para varios meses de sammy vez.      •Manténgase a joel si debe que salir a trabajar.Es posible que tenga un trabajo que solo se puede hacer fuera de benitez casa. Mantenga la distancia física entre usted y los demás trabajadores tanto jony sea posible. Siga las reglas de benitez empleador para que todos estén a joel.      Si tiene COVID-19 y se está recuperando en casa:Los médicos le darán instrucciones específicas que debe seguir. Las siguientes son pautas generales para recordarle cómo mantener a los demás a joel hasta que usted esté briana:   •Lávese las elton frecuentemente.Use agua y jabón tanto jony sea posible. Puede usar un desinfectante para elton que contenga alcohol, si no hay agua y jabón disponibles. No comparta toallas con nadie. Si usa toallas de papel, deséchelas en un cubo de basura recubierto que se guarda en benitez habitación o área. Use un cubo de basura cubierto, si es posible.      •No salga de benitez casa a menos que sea necesario.Es posible que tenga que ir al consultorio de benitez médico para hacerse chequeos o para resurtir sammy receta. No llegue al consultorio del médico sin sammy jillian. Los médicos tienen que hacer que cinthia consultorios juanpablo seguros para el personal y otros pacientes.      •No entre en contacto físico cercano con nadie, joel que sea necesario.Solo tenga un contacto físico cercano con sammy persona que lo cuide directamente, o con un bebé o pavithra que deba cuidar. Los miembros de la ron y los amigos no deben visitarlo. Si es posible, quédese en un área o habitación separada de benitez casa si vive con otras personas. Nadie debe entrar en el área o en la habitación excepto para brindarle cuidados. Puede visitar a los demás por teléfono, videochat, correo electrónico o sistemas similares. Es importante mantenerse conectado con los demás en benitez juan mientras se recupera.      •Use un tapabocas mientras haya otras personas cerca de usted.[place] puede ayudar a evitar que las gotitas propaguen el virus cuando usted habla, estornuda o tose. Póngase el tapabocas antes de que la persona entre en benitez habitación o área. Recuérdele a la persona que se cubra la nariz y la boca antes de entrar a brindarle cuidados.      •No comparta artículos.No comparta platos, toallas ni otros artículos con nadie. Los artículos deben ser lavados después de usarlos.      •Proteja a benitez bebé.Lávese las elton con agua y jabón con frecuencia lauren todo el día. Use un tapabocas mientras amamanta o mientras se extrae o se saca la leche materna. Si es posible, pídale a alguien que esté briana que cuide de benitez bebé. Puede poner la leche materna en biberones para que la persona la use, si es necesario. Hable con benitez médico si tiene preguntas o inquietudes acerca de cómo cuidar o vincularse con benitez bebé. También le dirá cuándo debe traer a benitez bebé para los chequeos y las vacunas. También le dirá qué hacer si mendy que benietz bebé está infectado con el nuevo virus.      •No manipule animales vivos.Hasta que se sepa más, es mejor no tocar, jugar o manipular animales vivos. Algunos animales, incluyendo las mascotas, barrientos sido infectados con el nuevo coronavirus. No manipule ni cuide animales hasta que esté briana. El cuidado incluye alimentar, acariciar y abrazar a benitez mascota. No deje que benitez mascota lo lama o comparta benitez comida. Pídale a alguien que no esté infectado que cuide de benitez mascota, si es posible. Si debe cuidar a sammy mascota, usa un tapabocas. Lávese las elton antes y después de cuidar a benitez mascota.      •Siga las instrucciones de benitez médico para estar cerca de los demás después de recuperarse.No se sabe con certeza si sammy persona recuperada puede transmitir el virus a otros, ni por cuánto tiempo. Benitez médico puede darle instrucciones, jony continuar con el distanciamiento social o usar un tapabocas cuando esté cerca de otras personas. Las siguientes son pautas generales para cuando puede estar cerca de otras personas:?Si no puga desarrollado ningún síntoma,espere al menos 10 días después de sammy prueba positiva. Benitez médico puede querer que tenga 2 pruebas negativas consecutivas con un intervalo de, al menos, 24 horas. [place] depende de la disponibilidad de las pruebas en benitez área.      ?Si tuvo síntomas,espere al menos 10 días después de que los síntomas aparecieron por primera vez. Entonces deberá pasar 24 horas sin fiebre sin recibir medicamentos para la fiebre. La mayoría de cinthia síntomas también deberán desaparecer. La pérdida del sentido del gusto o el olfato puede continuar lauren varios meses. Se considera que está briana estar cerca de otros si erica es el único síntoma.      ?Si fue hospitalizado por COVID-19 y necesitó oxígeno,benitez médico le dirá cuánto tiempo debe esperar. Es posible que deba esperar hasta 20 días después de la aparición de los síntomas. Puede ser menos tiempo si tiene 2 pruebas negativas consecutivas con un intervalo de, al menos, 24 horas. [place] dependerá de la disponibilidad de las pruebas en benitez área.        Cómo cuidar de alguien que tiene COVID-19:Si la persona vive en otro hogar, coordine un tiempo para brindar cuidados. Recuerde llevar algunos pares de guantes desechables y un tapabocas. Las siguientes son las pautas generales para ayudarle a cuidar de forma espino a cualquier persona que tenga COVID-19:  •Lávese las elton frecuentemente.Lávese antes y después de entrar en la casa, área o habitación de la persona. Deseche las toallas de papel en un cubo de basura recubierto que tenga sammy tapa, si es posible.      •No permita que otros se acerquen a la persona.Nadie debe ingresar a la casa de la persona a menos que sea necesario. De ser posible, la persona debe estar en un área o habitación separada si vive con otras personas. Mantenga la windy de la habitación cerrada a menos que necesite entrar o salir. Navya que otras personas llamen, charlen por video o envíen un correo electrónico a la persona si se siente lo suficientemente briana. La persona puede sentirse andre si se la mantiene separada lauren un tylor período de tiempo. La comunicación espino puede ayudar a esta persona a mantenerse en contacto con benitez ron y amigos.      •Asegúrese de que la habitación de la persona tenga un buen flujo de aire.Puede abrir la ventana si el clima lo permite. También se puede encender el aire acondicionado para ayudar a que el aire se mueva.      •Comuníquese con la persona antes de entrar para brindarle cuidados.Asegúrese de que la persona use un tapabocas. Recuérdele que se lave las elton con agua y jabón. Puede usar un desinfectante para elton que contenga alcohol, si no hay agua y jabón disponibles. Colóquese el tapabocas antes de entrar al lugar a brindar cuidados.      •Use guantes mientras yanelis cuidados y limpia.Limpie los objetos que la persona usa a menudo. Limpie las encimeras, las superficies de cocción y los frentes y el interior del microondas y el refrigerador. Limpie la ducha, el sanitario, el área alrededor del sanitario, el lavabo, el área alrededor del lavabo y los grifos. Junte la ropa sucia o la ropa de cama. Lave y seque los artículos con el agua más caliente que permita la mony. Lave los platos y utensilios usados en [place] y jabonosa o en un lavavajillas.      •Todo lo que deseche debe ir a un cubo de basura recubierto.Cuando necesite vaciar la basura, cierre el extremo abierto de la cubierta y átela. [place] ayuda a evitar que los artículos en los que está el virus se salgan de la basura. Quítese los guantes y deséchelos. Lávese las elton.      Acuda a la consulta de control con benitez médico según las indicaciones:Anote cinthia preguntas para que se acuerde de hacerlas lauren cinthia visitas.    Para más información:  •Centers for Disease Control and Prevention  [address] [place] [id number]  Phone: 1-508.800.6991  Web Address: http://www.cdc.gov           © Copyright [employer] 2020           back to top                          © Copyright [employer] 2020

## 2023-09-19 LAB
ALBUMIN SERPL-MCNC: 3.8 G/DL (ref 3.5–5)
ALBUMIN/GLOB SERPL: 1.1 (ref 1.1–2.2)
ALP SERPL-CCNC: 81 U/L (ref 45–117)
ALT SERPL-CCNC: 21 U/L (ref 12–78)
ANION GAP SERPL CALC-SCNC: 2 MMOL/L (ref 5–15)
AST SERPL-CCNC: 18 U/L (ref 15–37)
BILIRUB DIRECT SERPL-MCNC: <0.1 MG/DL (ref 0–0.2)
BILIRUB SERPL-MCNC: 0.3 MG/DL (ref 0.2–1)
BUN SERPL-MCNC: 19 MG/DL (ref 6–20)
BUN/CREAT SERPL: 14 (ref 12–20)
CALCIUM SERPL-MCNC: 9.7 MG/DL (ref 8.5–10.1)
CHLORIDE SERPL-SCNC: 110 MMOL/L (ref 97–108)
CHOLEST SERPL-MCNC: 223 MG/DL
CO2 SERPL-SCNC: 29 MMOL/L (ref 21–32)
CREAT SERPL-MCNC: 1.36 MG/DL (ref 0.7–1.3)
CREAT UR-MCNC: 149 MG/DL
ERYTHROCYTE [DISTWIDTH] IN BLOOD BY AUTOMATED COUNT: 13.2 % (ref 11.5–14.5)
GLOBULIN SER CALC-MCNC: 3.5 G/DL (ref 2–4)
GLUCOSE SERPL-MCNC: 97 MG/DL (ref 65–100)
HCT VFR BLD AUTO: 45.7 % (ref 36.6–50.3)
HDLC SERPL-MCNC: 69 MG/DL
HDLC SERPL: 3.2 (ref 0–5)
HGB BLD-MCNC: 14.2 G/DL (ref 12.1–17)
LDLC SERPL CALC-MCNC: 143.6 MG/DL (ref 0–100)
MCH RBC QN AUTO: 29.3 PG (ref 26–34)
MCHC RBC AUTO-ENTMCNC: 31.1 G/DL (ref 30–36.5)
MCV RBC AUTO: 94.2 FL (ref 80–99)
MICROALBUMIN UR-MCNC: 2.09 MG/DL
MICROALBUMIN/CREAT UR-RTO: 14 MG/G (ref 0–30)
NRBC # BLD: 0 K/UL (ref 0–0.01)
NRBC BLD-RTO: 0 PER 100 WBC
PLATELET # BLD AUTO: 166 K/UL (ref 150–400)
PMV BLD AUTO: 10.5 FL (ref 8.9–12.9)
POTASSIUM SERPL-SCNC: 4.6 MMOL/L (ref 3.5–5.1)
PROT SERPL-MCNC: 7.3 G/DL (ref 6.4–8.2)
PSA SERPL-MCNC: 12.9 NG/ML (ref 0.01–4)
RBC # BLD AUTO: 4.85 M/UL (ref 4.1–5.7)
SODIUM SERPL-SCNC: 141 MMOL/L (ref 136–145)
TRIGL SERPL-MCNC: 52 MG/DL
VLDLC SERPL CALC-MCNC: 10.4 MG/DL
WBC # BLD AUTO: 3.7 K/UL (ref 4.1–11.1)

## 2023-09-20 DIAGNOSIS — R97.20 ABNORMAL PROSTATE SPECIFIC ANTIGEN (PSA): Primary | ICD-10-CM

## 2023-10-23 ENCOUNTER — OFFICE VISIT (OUTPATIENT)
Facility: CLINIC | Age: 83
End: 2023-10-23
Payer: COMMERCIAL

## 2023-10-23 VITALS
DIASTOLIC BLOOD PRESSURE: 69 MMHG | WEIGHT: 166 LBS | HEIGHT: 68 IN | OXYGEN SATURATION: 99 % | TEMPERATURE: 98.5 F | RESPIRATION RATE: 16 BRPM | BODY MASS INDEX: 25.16 KG/M2 | HEART RATE: 75 BPM | SYSTOLIC BLOOD PRESSURE: 138 MMHG

## 2023-10-23 DIAGNOSIS — E78.5 HYPERLIPIDEMIA, UNSPECIFIED HYPERLIPIDEMIA TYPE: ICD-10-CM

## 2023-10-23 DIAGNOSIS — D72.819 LEUKOPENIA, UNSPECIFIED TYPE: ICD-10-CM

## 2023-10-23 DIAGNOSIS — R97.20 ELEVATED PSA: ICD-10-CM

## 2023-10-23 DIAGNOSIS — I10 ESSENTIAL HYPERTENSION: Primary | ICD-10-CM

## 2023-10-23 PROCEDURE — 99214 OFFICE O/P EST MOD 30 MIN: CPT | Performed by: STUDENT IN AN ORGANIZED HEALTH CARE EDUCATION/TRAINING PROGRAM

## 2023-10-23 PROCEDURE — 1123F ACP DISCUSS/DSCN MKR DOCD: CPT | Performed by: STUDENT IN AN ORGANIZED HEALTH CARE EDUCATION/TRAINING PROGRAM

## 2023-10-23 PROCEDURE — 3075F SYST BP GE 130 - 139MM HG: CPT | Performed by: STUDENT IN AN ORGANIZED HEALTH CARE EDUCATION/TRAINING PROGRAM

## 2023-10-23 PROCEDURE — 3078F DIAST BP <80 MM HG: CPT | Performed by: STUDENT IN AN ORGANIZED HEALTH CARE EDUCATION/TRAINING PROGRAM

## 2023-10-23 SDOH — ECONOMIC STABILITY: FOOD INSECURITY: WITHIN THE PAST 12 MONTHS, YOU WORRIED THAT YOUR FOOD WOULD RUN OUT BEFORE YOU GOT MONEY TO BUY MORE.: NEVER TRUE

## 2023-10-23 SDOH — ECONOMIC STABILITY: INCOME INSECURITY: HOW HARD IS IT FOR YOU TO PAY FOR THE VERY BASICS LIKE FOOD, HOUSING, MEDICAL CARE, AND HEATING?: NOT HARD AT ALL

## 2023-10-23 SDOH — ECONOMIC STABILITY: FOOD INSECURITY: WITHIN THE PAST 12 MONTHS, THE FOOD YOU BOUGHT JUST DIDN'T LAST AND YOU DIDN'T HAVE MONEY TO GET MORE.: NEVER TRUE

## 2023-10-23 SDOH — ECONOMIC STABILITY: HOUSING INSECURITY
IN THE LAST 12 MONTHS, WAS THERE A TIME WHEN YOU DID NOT HAVE A STEADY PLACE TO SLEEP OR SLEPT IN A SHELTER (INCLUDING NOW)?: NO

## 2023-10-23 ASSESSMENT — ENCOUNTER SYMPTOMS
COUGH: 0
SHORTNESS OF BREATH: 0
RHINORRHEA: 0
VOMITING: 0
NAUSEA: 0
ABDOMINAL PAIN: 0

## 2023-10-23 ASSESSMENT — PATIENT HEALTH QUESTIONNAIRE - PHQ9
SUM OF ALL RESPONSES TO PHQ QUESTIONS 1-9: 0
SUM OF ALL RESPONSES TO PHQ9 QUESTIONS 1 & 2: 0
2. FEELING DOWN, DEPRESSED OR HOPELESS: 0
SUM OF ALL RESPONSES TO PHQ QUESTIONS 1-9: 0
1. LITTLE INTEREST OR PLEASURE IN DOING THINGS: 0
SUM OF ALL RESPONSES TO PHQ QUESTIONS 1-9: 0
SUM OF ALL RESPONSES TO PHQ QUESTIONS 1-9: 0

## 2023-11-03 ENCOUNTER — TELEPHONE (OUTPATIENT)
Facility: CLINIC | Age: 83
End: 2023-11-03

## 2023-11-03 NOTE — TELEPHONE ENCOUNTER
Two patient Identification confirmed.   Spoke with .  Dr. Sukumar Donnelly will call office back to assist with Blood, smear lab questions.         Morphologically compatible with some of the CBC

## 2023-12-27 NOTE — TELEPHONE ENCOUNTER
LOV: Thursday, January 17, 2019
Received VO from Dr. Phillip Fisher to approve for qty of 90 with 3 refills.
No

## 2024-02-17 DIAGNOSIS — I10 ESSENTIAL HYPERTENSION: ICD-10-CM

## 2024-02-19 NOTE — TELEPHONE ENCOUNTER
PCP: Leda Wing MD    Last appt: [unfilled]  No future appointments.    Requested Prescriptions     Pending Prescriptions Disp Refills    enalapril (VASOTEC) 20 MG tablet [Pharmacy Med Name: ENALAPRIL 20MG TABLETS] 30 tablet      Sig: TAKE 1 TABLET BY MOUTH IN THE MORNING       Prior labs and Blood pressures:  BP Readings from Last 3 Encounters:   10/23/23 138/69   09/18/23 (!) 172/73   07/21/22 134/78     Lab Results   Component Value Date/Time     09/18/2023 03:30 PM    K 4.6 09/18/2023 03:30 PM     09/18/2023 03:30 PM    CO2 29 09/18/2023 03:30 PM    BUN 19 09/18/2023 03:30 PM    GFRAA >60 07/21/2022 03:23 PM     No results found for: \"HBA1C\", \"[id number]\"  Lab Results   Component Value Date/Time    CHOL 223 09/18/2023 03:30 PM    HDL 69 09/18/2023 03:30 PM     No results found for: \"VITD3\", \"VD3RIA\"    No results found for: \"TSH\", \"TSH2\", \"TSH3\"

## 2024-02-20 RX ORDER — ENALAPRIL MALEATE 20 MG/1
20 TABLET ORAL EVERY MORNING
Qty: 90 TABLET | Refills: 0 | Status: SHIPPED | OUTPATIENT
Start: 2024-02-20

## 2024-03-14 DIAGNOSIS — I10 ESSENTIAL HYPERTENSION: ICD-10-CM

## 2024-03-14 RX ORDER — FELODIPINE 10 MG/1
10 TABLET, EXTENDED RELEASE ORAL DAILY
Qty: 90 TABLET | Refills: 1 | Status: SHIPPED | OUTPATIENT
Start: 2024-03-14

## 2024-03-14 NOTE — TELEPHONE ENCOUNTER
PCP: Leda Wing MD    Last appt: [unfilled]  No future appointments.    Requested Prescriptions     Pending Prescriptions Disp Refills    felodipine (PLENDIL) 10 MG extended release tablet [Pharmacy Med Name: FELODIPINE 10MG ER TABLETS] 90 tablet 0     Sig: TAKE 1 TABLET BY MOUTH DAILY       Prior labs and Blood pressures:  BP Readings from Last 3 Encounters:   10/23/23 138/69   09/18/23 (!) 172/73   07/21/22 134/78     Lab Results   Component Value Date/Time     09/18/2023 03:30 PM    K 4.6 09/18/2023 03:30 PM     09/18/2023 03:30 PM    CO2 29 09/18/2023 03:30 PM    BUN 19 09/18/2023 03:30 PM    GFRAA >60 07/21/2022 03:23 PM     No results found for: \"HBA1C\", \"LIU2GORY\"  Lab Results   Component Value Date/Time    CHOL 223 09/18/2023 03:30 PM    HDL 69 09/18/2023 03:30 PM     No results found for: \"VITD3\", \"VD3RIA\"    No results found for: \"TSH\", \"TSH2\", \"TSH3\"

## 2024-05-22 DIAGNOSIS — I10 ESSENTIAL HYPERTENSION: ICD-10-CM

## 2024-05-22 NOTE — TELEPHONE ENCOUNTER
PCP: Leda Wing MD    Last appt: [unfilled]  No future appointments.    Requested Prescriptions     Pending Prescriptions Disp Refills    enalapril (VASOTEC) 20 MG tablet [Pharmacy Med Name: ENALAPRIL 20MG TABLETS] 90 tablet 0     Sig: TAKE 1 TABLET BY MOUTH IN THE MORNING       Prior labs and Blood pressures:  BP Readings from Last 3 Encounters:   10/23/23 138/69   09/18/23 (!) 172/73   07/21/22 134/78     Lab Results   Component Value Date/Time     09/18/2023 03:30 PM    K 4.6 09/18/2023 03:30 PM     09/18/2023 03:30 PM    CO2 29 09/18/2023 03:30 PM    BUN 19 09/18/2023 03:30 PM    GFRAA >60 07/21/2022 03:23 PM     No results found for: \"HBA1C\", \"[id number]\"  Lab Results   Component Value Date/Time    CHOL 223 09/18/2023 03:30 PM    HDL 69 09/18/2023 03:30 PM    .6 09/18/2023 03:30 PM    VLDL 10.4 09/18/2023 03:30 PM     No results found for: \"VITD3\"    No results found for: \"TSH\", \"TSH2\", \"TSH3\"

## 2024-05-23 RX ORDER — ENALAPRIL MALEATE 20 MG/1
20 TABLET ORAL EVERY MORNING
Qty: 90 TABLET | Refills: 0 | Status: SHIPPED | OUTPATIENT
Start: 2024-05-23

## 2024-05-28 DIAGNOSIS — I10 ESSENTIAL HYPERTENSION: ICD-10-CM

## 2024-05-28 NOTE — TELEPHONE ENCOUNTER
PCP: Leda Wing MD    Last appt: [unfilled]  No future appointments.    Requested Prescriptions     Pending Prescriptions Disp Refills    doxazosin (CARDURA) 8 MG tablet [Pharmacy Med Name: DOXAZOSIN 8MG TABLETS] 90 tablet 0     Sig: TAKE 1 TABLET BY MOUTH IN THE MORNING       Prior labs and Blood pressures:  BP Readings from Last 3 Encounters:   10/23/23 138/69   09/18/23 (!) 172/73   07/21/22 134/78     Lab Results   Component Value Date/Time     09/18/2023 03:30 PM    K 4.6 09/18/2023 03:30 PM     09/18/2023 03:30 PM    CO2 29 09/18/2023 03:30 PM    BUN 19 09/18/2023 03:30 PM    GFRAA >60 07/21/2022 03:23 PM     No results found for: \"HBA1C\", \"[id number]\"  Lab Results   Component Value Date/Time    CHOL 223 09/18/2023 03:30 PM    HDL 69 09/18/2023 03:30 PM    .6 09/18/2023 03:30 PM    VLDL 10.4 09/18/2023 03:30 PM     No results found for: \"VITD3\"    No results found for: \"TSH\", \"TSH2\", \"TSH3\"

## 2024-05-30 RX ORDER — DOXAZOSIN 8 MG/1
8 TABLET ORAL EVERY MORNING
Qty: 90 TABLET | Refills: 0 | Status: SHIPPED | OUTPATIENT
Start: 2024-05-30

## 2024-09-10 DIAGNOSIS — I10 ESSENTIAL HYPERTENSION: ICD-10-CM

## 2024-09-10 RX ORDER — FELODIPINE 10 MG/1
10 TABLET, EXTENDED RELEASE ORAL DAILY
Qty: 30 TABLET | Refills: 0 | Status: SHIPPED | OUTPATIENT
Start: 2024-09-10

## 2024-09-16 ENCOUNTER — OFFICE VISIT (OUTPATIENT)
Facility: CLINIC | Age: 84
End: 2024-09-16
Payer: MEDICARE

## 2024-09-16 VITALS
HEIGHT: 68 IN | BODY MASS INDEX: 24.25 KG/M2 | HEART RATE: 87 BPM | DIASTOLIC BLOOD PRESSURE: 77 MMHG | TEMPERATURE: 97.1 F | OXYGEN SATURATION: 95 % | SYSTOLIC BLOOD PRESSURE: 130 MMHG | WEIGHT: 160 LBS

## 2024-09-16 DIAGNOSIS — I10 ESSENTIAL HYPERTENSION: Primary | ICD-10-CM

## 2024-09-16 DIAGNOSIS — E78.5 HYPERLIPIDEMIA, UNSPECIFIED HYPERLIPIDEMIA TYPE: ICD-10-CM

## 2024-09-16 DIAGNOSIS — Z13.1 ENCOUNTER FOR SCREENING FOR DIABETES MELLITUS: ICD-10-CM

## 2024-09-16 DIAGNOSIS — C61 MALIGNANT NEOPLASM OF PROSTATE (HCC): ICD-10-CM

## 2024-09-16 PROBLEM — R97.20 ELEVATED PSA: Status: ACTIVE | Noted: 2024-09-16

## 2024-09-16 PROCEDURE — 1036F TOBACCO NON-USER: CPT | Performed by: STUDENT IN AN ORGANIZED HEALTH CARE EDUCATION/TRAINING PROGRAM

## 2024-09-16 PROCEDURE — G8427 DOCREV CUR MEDS BY ELIG CLIN: HCPCS | Performed by: STUDENT IN AN ORGANIZED HEALTH CARE EDUCATION/TRAINING PROGRAM

## 2024-09-16 PROCEDURE — 1123F ACP DISCUSS/DSCN MKR DOCD: CPT | Performed by: STUDENT IN AN ORGANIZED HEALTH CARE EDUCATION/TRAINING PROGRAM

## 2024-09-16 PROCEDURE — 99214 OFFICE O/P EST MOD 30 MIN: CPT | Performed by: STUDENT IN AN ORGANIZED HEALTH CARE EDUCATION/TRAINING PROGRAM

## 2024-09-16 PROCEDURE — G8420 CALC BMI NORM PARAMETERS: HCPCS | Performed by: STUDENT IN AN ORGANIZED HEALTH CARE EDUCATION/TRAINING PROGRAM

## 2024-09-16 RX ORDER — FELODIPINE 10 MG/1
10 TABLET, EXTENDED RELEASE ORAL DAILY
Qty: 90 TABLET | Refills: 1 | Status: SHIPPED | OUTPATIENT
Start: 2024-09-16

## 2024-09-16 RX ORDER — ENALAPRIL MALEATE 20 MG/1
20 TABLET ORAL EVERY MORNING
Qty: 90 TABLET | Refills: 1 | Status: SHIPPED | OUTPATIENT
Start: 2024-09-16

## 2024-09-16 RX ORDER — TAMSULOSIN HYDROCHLORIDE 0.4 MG/1
0.4 CAPSULE ORAL NIGHTLY
COMMUNITY
Start: 2024-07-24

## 2024-09-16 RX ORDER — DOXAZOSIN 8 MG/1
8 TABLET ORAL EVERY MORNING
Qty: 90 TABLET | Refills: 1 | Status: SHIPPED | OUTPATIENT
Start: 2024-09-16

## 2024-09-16 ASSESSMENT — ENCOUNTER SYMPTOMS
RHINORRHEA: 0
COUGH: 0
NAUSEA: 0
ABDOMINAL PAIN: 0
SHORTNESS OF BREATH: 0
VOMITING: 0

## 2024-09-16 ASSESSMENT — PATIENT HEALTH QUESTIONNAIRE - PHQ9
SUM OF ALL RESPONSES TO PHQ9 QUESTIONS 1 & 2: 0
1. LITTLE INTEREST OR PLEASURE IN DOING THINGS: NOT AT ALL
SUM OF ALL RESPONSES TO PHQ QUESTIONS 1-9: 0
SUM OF ALL RESPONSES TO PHQ QUESTIONS 1-9: 0
2. FEELING DOWN, DEPRESSED OR HOPELESS: NOT AT ALL
SUM OF ALL RESPONSES TO PHQ QUESTIONS 1-9: 0
SUM OF ALL RESPONSES TO PHQ QUESTIONS 1-9: 0

## 2024-09-17 LAB
ALBUMIN SERPL-MCNC: 3.7 G/DL (ref 3.5–5)
ALBUMIN/GLOB SERPL: 1.1 (ref 1.1–2.2)
ALP SERPL-CCNC: 67 U/L (ref 45–117)
ALT SERPL-CCNC: 16 U/L (ref 12–78)
ANION GAP SERPL CALC-SCNC: 3 MMOL/L (ref 2–12)
AST SERPL-CCNC: 16 U/L (ref 15–37)
BILIRUB SERPL-MCNC: 0.4 MG/DL (ref 0.2–1)
BUN SERPL-MCNC: 18 MG/DL (ref 6–20)
BUN/CREAT SERPL: 12 (ref 12–20)
CALCIUM SERPL-MCNC: 9.7 MG/DL (ref 8.5–10.1)
CHLORIDE SERPL-SCNC: 111 MMOL/L (ref 97–108)
CHOLEST SERPL-MCNC: 208 MG/DL
CO2 SERPL-SCNC: 28 MMOL/L (ref 21–32)
CREAT SERPL-MCNC: 1.56 MG/DL (ref 0.7–1.3)
CREAT UR-MCNC: 301 MG/DL
ERYTHROCYTE [DISTWIDTH] IN BLOOD BY AUTOMATED COUNT: 13.4 % (ref 11.5–14.5)
EST. AVERAGE GLUCOSE BLD GHB EST-MCNC: 108 MG/DL
GLOBULIN SER CALC-MCNC: 3.3 G/DL (ref 2–4)
GLUCOSE SERPL-MCNC: 123 MG/DL (ref 65–100)
HBA1C MFR BLD: 5.4 % (ref 4–5.6)
HCT VFR BLD AUTO: 47 % (ref 36.6–50.3)
HDLC SERPL-MCNC: 64 MG/DL
HDLC SERPL: 3.3 (ref 0–5)
HGB BLD-MCNC: 14.8 G/DL (ref 12.1–17)
LDLC SERPL CALC-MCNC: 128.8 MG/DL (ref 0–100)
MCH RBC QN AUTO: 29.8 PG (ref 26–34)
MCHC RBC AUTO-ENTMCNC: 31.5 G/DL (ref 30–36.5)
MCV RBC AUTO: 94.6 FL (ref 80–99)
MICROALBUMIN UR-MCNC: 1.38 MG/DL
MICROALBUMIN/CREAT UR-RTO: 5 MG/G (ref 0–30)
NRBC # BLD: 0 K/UL (ref 0–0.01)
NRBC BLD-RTO: 0 PER 100 WBC
PLATELET # BLD AUTO: 156 K/UL (ref 150–400)
PMV BLD AUTO: 11.1 FL (ref 8.9–12.9)
POTASSIUM SERPL-SCNC: 4.4 MMOL/L (ref 3.5–5.1)
PROT SERPL-MCNC: 7 G/DL (ref 6.4–8.2)
PSA SERPL-MCNC: 11.2 NG/ML (ref 0.01–4)
RBC # BLD AUTO: 4.97 M/UL (ref 4.1–5.7)
SODIUM SERPL-SCNC: 142 MMOL/L (ref 136–145)
TRIGL SERPL-MCNC: 76 MG/DL
TSH SERPL DL<=0.05 MIU/L-ACNC: 1.03 UIU/ML (ref 0.36–3.74)
VLDLC SERPL CALC-MCNC: 15.2 MG/DL
WBC # BLD AUTO: 4 K/UL (ref 4.1–11.1)

## 2025-02-12 ENCOUNTER — OFFICE VISIT (OUTPATIENT)
Facility: CLINIC | Age: 85
End: 2025-02-12

## 2025-02-12 VITALS
WEIGHT: 163 LBS | DIASTOLIC BLOOD PRESSURE: 76 MMHG | SYSTOLIC BLOOD PRESSURE: 129 MMHG | HEART RATE: 96 BPM | RESPIRATION RATE: 16 BRPM | OXYGEN SATURATION: 97 % | BODY MASS INDEX: 24.71 KG/M2 | HEIGHT: 68 IN | TEMPERATURE: 97.4 F

## 2025-02-12 DIAGNOSIS — Z00.00 INITIAL MEDICARE ANNUAL WELLNESS VISIT: Primary | ICD-10-CM

## 2025-02-12 DIAGNOSIS — I10 ESSENTIAL HYPERTENSION: ICD-10-CM

## 2025-02-12 DIAGNOSIS — C61 MALIGNANT NEOPLASM OF PROSTATE (HCC): ICD-10-CM

## 2025-02-12 RX ORDER — ENALAPRIL MALEATE 20 MG/1
20 TABLET ORAL EVERY MORNING
Qty: 90 TABLET | Refills: 1 | Status: SHIPPED | OUTPATIENT
Start: 2025-02-12

## 2025-02-12 RX ORDER — FELODIPINE 10 MG/1
10 TABLET, EXTENDED RELEASE ORAL DAILY
Qty: 90 TABLET | Refills: 1 | Status: SHIPPED | OUTPATIENT
Start: 2025-02-12

## 2025-02-12 RX ORDER — DOXAZOSIN 8 MG/1
8 TABLET ORAL EVERY MORNING
Qty: 90 TABLET | Refills: 1 | Status: SHIPPED | OUTPATIENT
Start: 2025-02-12

## 2025-02-12 SDOH — ECONOMIC STABILITY: FOOD INSECURITY: WITHIN THE PAST 12 MONTHS, THE FOOD YOU BOUGHT JUST DIDN'T LAST AND YOU DIDN'T HAVE MONEY TO GET MORE.: NEVER TRUE

## 2025-02-12 SDOH — ECONOMIC STABILITY: FOOD INSECURITY: WITHIN THE PAST 12 MONTHS, YOU WORRIED THAT YOUR FOOD WOULD RUN OUT BEFORE YOU GOT MONEY TO BUY MORE.: NEVER TRUE

## 2025-02-12 ASSESSMENT — PATIENT HEALTH QUESTIONNAIRE - PHQ9
2. FEELING DOWN, DEPRESSED OR HOPELESS: NOT AT ALL
SUM OF ALL RESPONSES TO PHQ QUESTIONS 1-9: 0
SUM OF ALL RESPONSES TO PHQ9 QUESTIONS 1 & 2: 0
SUM OF ALL RESPONSES TO PHQ QUESTIONS 1-9: 0
1. LITTLE INTEREST OR PLEASURE IN DOING THINGS: NOT AT ALL

## 2025-02-12 ASSESSMENT — ENCOUNTER SYMPTOMS
RHINORRHEA: 0
COUGH: 0
VOMITING: 0
NAUSEA: 0
SHORTNESS OF BREATH: 0
ABDOMINAL PAIN: 0

## 2025-02-12 ASSESSMENT — LIFESTYLE VARIABLES
HOW MANY STANDARD DRINKS CONTAINING ALCOHOL DO YOU HAVE ON A TYPICAL DAY: 1 OR 2
HOW OFTEN DO YOU HAVE A DRINK CONTAINING ALCOHOL: MONTHLY OR LESS

## 2025-02-12 NOTE — PROGRESS NOTES
\"Have you been to the ER, urgent care clinic since your last visit?  Hospitalized since your last visit?\"    NO    “Have you seen or consulted any other health care providers outside our system since your last visit?”    NO         Chief Complaint   Patient presents with    Medicare AWV     PSA Concerns     Health Maintenance Due   Topic Date Due    DTaP/Tdap/Td vaccine (1 - Tdap) Never done    Shingles vaccine (1 of 2) Never done    Pneumococcal 50+ years Vaccine (1 of 1 - PCV) Never done    Respiratory Syncytial Virus (RSV) Pregnant or age 60 yrs+ (1 - 1-dose 75+ series) Never done    Flu vaccine (1) Never done    COVID-19 Vaccine (1 - 2024-25 season) Never done    Annual Wellness Visit (Medicare Advantage)  Never done     PHQ-9 Total Score: 0 (2/12/2025  1:10 PM)        2/12/2025     1:00 PM   AMB Abuse Screening   Do you ever feel afraid of your partner? N   Are you in a relationship with someone who physically or mentally threatens you? N   Is it safe for you to go home? Y         2/12/2025     1:10 PM   Amb Fall Risk Assessment and TUG Test   Do you feel unsteady or are you worried about falling?  no   2 or more falls in past year? no   Fall with injury in past year? no     Vitals:    02/12/25 1309   BP: 129/76   Pulse: 96   Resp: 16   Temp: 97.4 °F (36.3 °C)   SpO2: 97%       
and where indicated follow up appointments were made and/or referrals ordered.    Positive Risk Factor Screenings with Interventions:     Cognitive:   Clock Drawing Test (CDT): (!) Abnormal  Words recalled: 3 Words Recalled  Total Score: 3  Total Score Interpretation: Normal Mini-Cog  Interventions:  Patient advised to follow-up in this office for further evaluation and treatment                    Advanced Directives:  Do you have a Living Will?: (!) No    Intervention:  has NO advanced directive - information provided             Objective   Vitals:    02/12/25 1309   BP: 129/76   Site: Left Upper Arm   Position: Sitting   Cuff Size: Large Adult   Pulse: 96   Resp: 16   Temp: 97.4 °F (36.3 °C)   TempSrc: Skin   SpO2: 97%   Weight: 73.9 kg (163 lb)   Height: 1.727 m (5' 8\")      Body mass index is 24.78 kg/m².              No Known Allergies  Prior to Visit Medications    Medication Sig Taking? Authorizing Provider   doxazosin (CARDURA) 8 MG tablet Take 1 tablet by mouth every morning Yes Leda Wing MD   enalapril (VASOTEC) 20 MG tablet Take 1 tablet by mouth every morning Yes Leda Wing MD   felodipine (PLENDIL) 10 MG extended release tablet Take 1 tablet by mouth daily Yes Leda Wing MD   tamsulosin (FLOMAX) 0.4 MG capsule Take 1 capsule by mouth nightly Yes ProviderLuz MD   latanoprost (XALATAN) 0.005 % ophthalmic solution INSTILL 1 DROP IN BOTH EYES AT BEDTIME Yes Luz Perez MD   bimatoprost (LUMIGAN) 0.01 % SOLN ophthalmic drops INT 1 GTT IN OU QD HS Yes Automatic Reconciliation, Ar       CareTeam (Including outside providers/suppliers regularly involved in providing care):   Patient Care Team:  Leda Wing MD as PCP - General (Family Medicine)  Leda Wing MD as PCP - Empaneled Provider     Recommendations for Preventive Services Due: see orders and patient instructions/AVS.  Recommended screening schedule for the next 5-10 years is

## 2025-02-12 NOTE — PATIENT INSTRUCTIONS
have more than one doctor, make sure that each one has a copy.  Put a copy of your living will where it can be easily found. For example, some people may put a copy on their refrigerator door. If you are using a digital copy, be sure your doctor, family members, and health care agent know how to find and access it.  Where can you learn more?  Go to https://www.Hotlist.net/patientEd and enter K356 to learn more about \"Learning About Living White.\"  Current as of: November 16, 2023  Content Version: 14.3  © 2024 [employer].   Care instructions adapted under license by [employer]. If you have questions about a medical condition or this instruction, always ask your healthcare professional. [employer], disclaims any warranty or liability for your use of this information.         Learning About Medical Power of   What is a medical power of ?     A medical power of , also called a durable power of  for health care, is one type of the legal forms called advance directives. It lets you name the person you want to make treatment decisions for you if you can't speak or decide for yourself. The person you choose is called your health care agent. This person is also called a health care proxy or health care surrogate.  A medical power of  may be called something else in your state.  How do you choose a health care agent?  Choose your health care agent carefully. This person may or may not be a family member.  Talk to the person before you make your final decision. Make sure this person is comfortable with this responsibility.  It's a good idea to choose someone who:  Is at least 18 years old.  Knows you well and understands what makes life meaningful for you.  Understands your Nondenominational and moral values.  Will do what you want, not what that person wants.  Will be able to make difficult choices at a stressful time.  Will be able to refuse or stop treatment, if

## 2025-04-01 DIAGNOSIS — I10 ESSENTIAL HYPERTENSION: ICD-10-CM

## 2025-04-02 RX ORDER — ENALAPRIL MALEATE 20 MG/1
20 TABLET ORAL EVERY MORNING
Qty: 90 TABLET | Refills: 1 | OUTPATIENT
Start: 2025-04-02

## 2025-04-07 DIAGNOSIS — I10 ESSENTIAL HYPERTENSION: ICD-10-CM

## 2025-04-08 RX ORDER — DOXAZOSIN 8 MG/1
8 TABLET ORAL EVERY MORNING
Qty: 90 TABLET | Refills: 1 | OUTPATIENT
Start: 2025-04-08